# Patient Record
Sex: MALE
[De-identification: names, ages, dates, MRNs, and addresses within clinical notes are randomized per-mention and may not be internally consistent; named-entity substitution may affect disease eponyms.]

---

## 2022-01-21 ENCOUNTER — HOSPITAL ENCOUNTER (EMERGENCY)
Dept: HOSPITAL 97 - ER | Age: 62
End: 2022-01-21
Payer: COMMERCIAL

## 2022-01-21 VITALS — OXYGEN SATURATION: 98 %

## 2022-01-21 VITALS — DIASTOLIC BLOOD PRESSURE: 24 MMHG | SYSTOLIC BLOOD PRESSURE: 45 MMHG

## 2022-01-21 DIAGNOSIS — Z99.2: ICD-10-CM

## 2022-01-21 DIAGNOSIS — N18.6: ICD-10-CM

## 2022-01-21 DIAGNOSIS — D63.1: ICD-10-CM

## 2022-01-21 DIAGNOSIS — E66.9: ICD-10-CM

## 2022-01-21 DIAGNOSIS — R57.0: Primary | ICD-10-CM

## 2022-01-21 DIAGNOSIS — Z88.5: ICD-10-CM

## 2022-01-21 DIAGNOSIS — E87.2: ICD-10-CM

## 2022-01-21 DIAGNOSIS — J96.00: ICD-10-CM

## 2022-01-21 PROCEDURE — 92950 HEART/LUNG RESUSCITATION CPR: CPT

## 2022-01-21 PROCEDURE — 93005 ELECTROCARDIOGRAM TRACING: CPT

## 2022-01-21 PROCEDURE — 99291 CRITICAL CARE FIRST HOUR: CPT

## 2022-01-21 PROCEDURE — 31500 INSERT EMERGENCY AIRWAY: CPT

## 2022-01-21 PROCEDURE — 82947 ASSAY GLUCOSE BLOOD QUANT: CPT

## 2022-01-21 PROCEDURE — 82805 BLOOD GASES W/O2 SATURATION: CPT

## 2022-01-21 NOTE — EDPHYS
Physician Documentation                                                                           

 [facility]                                                                 

Name: Bhavik Norris                                                                                

Age: 61 yrs                                                                                       

Sex: Male                                                                                         

: 1960                                                                                   

MRN: [id number]                                                                                   

Arrival Date: 2022                                                                          

Time: 15:38                                                                                       

Account#: [id number]                                                                            

Bed 3                                                                                             

Private MD:                                                                                       

ED Physician Mohit Ruff                                                                      

HPI:                                                                                              

                                                                                             

16:53 This 61 yrs old  Male presents to ER via EMS with complaints of CPR.           omega 

16:53 Preceding the arrest, the patient collapsed, was dyspneic, was found down by family.    omega 

      The arrest occurred at home. Pre-hospital course: EMS care prior to arrival: initiation     

      of ACLS, oxygen, 100% by ET tube. ACLS has been in progress for 20 minutes. ACLS            

      details: Initial rhythm was PEA. The presenting rhythm is PEA. Airway: oral intubation,     

      see erik, acls protocol. The patient has not experienced similar symptoms in the past.       

                                                                                                  

Historical:                                                                                       

- Allergies:                                                                                      

16:16 Morphine;                                                                               ss  

- PMHx:                                                                                           

16:16 Renal failure; HD (at home);                                                            ss  

                                                                                                  

- Immunization history:: Client reports receiving the 2nd dose of the Covid vaccine.              

- Family history:: not pertinent.                                                                 

                                                                                                  

                                                                                                  

ROS:                                                                                              

16:53 Unable to obtain ROS due to patient is on ventilator, CPR in progress.                  omega 

                                                                                                  

Exam:                                                                                             

16:53 Eyes: Pupils: are fixed and dilated.                                                    omega 

16:53 Cardiovascular: Rate: actual rate is  0 bpm, Rhythm: irregular, Pulses: not palpable,       

      Heart sounds: none, Edema: 1+ edema to level of left midcalf and right midcalf, JVD: is     

      not appreciated.                                                                            

16:53 ECG was reviewed by the Attending Physician.                                                

                                                                                                  

Vital Signs:                                                                                      

15:27 Pulse 0; Resp 16;                                                                       ss  

15:34 Pulse 184;                                                                              ss  

15:36  / 62; Pulse Ox 100% on ETT ambu;                                                 ss  

15:39  / 96; Pulse 159; Resp 18; Pulse Ox 98% on 100% FiO2 ETT vent;                    ss  

15:42  / 87;                                                                            ss  

16:01  / 72; Pulse 152; Resp 20 A;                                                      jh6 

16:01 BP 47 / 29; Pulse 56; Resp 20 A;                                                        jh6 

16:05 BP 90 / 48; Pulse 111; Resp 20 A;                                                       jh6 

16:07 BP 54 / 29; Pulse 54; Resp 20 A;                                                        jh6 

16:10 BP 45 / 24; Pulse 42; Resp 20 A;                                                        jh6 

                                                                                                  

Procedures:                                                                                       

16:59 Intubation: Intubated orally using # 4 Rodrigo blade with 7.5 mm ETT. was successful  omega 

      on first attempt. Ventilated with Ambu bag. Cricoid pressure applied during procedure.      

      Tube secured at right side of mouth Placement verified by CO2 detector with (+) color       

      change, auscultating bilateral breath sounds, Patient tolerated well. Central Line: the     

      site was prepped with Betadine, in sterile fashion, a triple lumen catheter was             

      inserted, in the right femoral vein, in 2 attempts. placement was verified, by blood        

      return, the site was dressed with Tegaderm, using sterile technique, the patient            

      tolerated the procedure, well.                                                              

                                                                                                  

MDM:                                                                                              

16:31 Patient medically screened.                                                             omega 

16:58 Differential diagnosis: arrythmia, cardiac arrest, respiratory arrest. Data reviewed:   [facility] 

      vital signs, nurses notes, lab test result(s), EKG, radiologic studies. Data                

      interpreted: Cardiac monitor: rate is 112 beats/min, rhythm is regular, Pulse oximetry:     

      on ventilator is 96 %. Test interpretation: by ED physician or midlevel provider: ECG,      

      plain radiologic studies.                                                                   

                                                                                                  

                                                                                             

15:45 Order name: Glucose, Ancillary Testing                                                  EDMS

                                                                                                  

EC:53 Rate is 112 beats/min. Rhythm is regular. OR interval is normal. QRS interval is        omega 

      normal. QT interval is normal. No Q waves. T waves are Normal in leads II, III, aVF. T      

      waves are Inverted in leads V4, V5, V6. Interpreted by me. Reviewed by me.                  

                                                                                                  

Administered Medications:                                                                         

15:31 Drug: EPINEPHrine 0.1mg/mL 1:10,000 1 mg {Note: Dialysis catheter R anterior chest      ss  

      wall, per Dr. Ruff.} Route: IVP; Site: Other;                                           

15:32 Drug: Sodium Bicarbonate 1 amp Route: IVP; Site: Other;                                 ss  

15:34 Drug: D50W 50 ml Route: IVP; Site: Other;                                               ss  

15:50 Drug: EPINEPHrine 0.1mg/mL 1:10,000 1 mg Route: IVP; Site: Other;                       ss  

15:51 Drug: Sodium Bicarbonate 1 amp Route: IVP; Site: right femoral;                         ss  

15:56 Drug: EPINEPHrine 0.1mg/mL 1:10,000 1 mg Route: IVP; Site: right femoral;               ss  

                                                                                                  

                                                                                                  

Disposition Summary:                                                                              

22 17:04                                                                                    

Patient                                                                                    

      Location:  Home                                                                  omega 

      Pronouncing Physician: Mohit Ruff cha 

      Time of Death: 16:45 2022                                                         omega 

      Diagnosis                                                                                   

        - Acute respiratory failure                                                           omega 

        - Acidosis                                                                            omega 

        - End stage renal disease - on HD                                                     omega 

        - Obesity, unspecified                                                                omega 

        - Do not resuscitate                                                                  omega 

        - Anemia, unspecified                                                                 oemga 

        - Cardiogenic shock                                                                   omega 

        - Cardiac arrest due to underlying cardiac condition                                  omega 

Signatures:                                                                                       

Dispatcher [employer]                           EDMohit Duncan MD MD cha Smirch, [place], RN                      RN   ss                                                   

                                                                                                  

Corrections: (The following items were deleted from the chart)                                    

16:47 15:48 Chest Single View+RAD.RAD.BRZ ordered. EDMS                                       AMADO

                                                                                                  

**************************************************************************************************

## 2022-01-21 NOTE — ER
Nurse's Notes                                                                                     

 [facility]                                                                 

Name: Bhavik Norris                                                                                

Age: 61 yrs                                                                                       

Sex: Male                                                                                         

: 1960                                                                                   

MRN: [id number]                                                                                   

Arrival Date: 2022                                                                          

Time: 15:38                                                                                       

Account#: [id number]                                                                            

Bed 3                                                                                             

Private MD:                                                                                       

Diagnosis: Acute respiratory failure;Acidosis;End stage renal disease-on HD;Obesity,              

  unspecified;Do not resuscitate;Anemia, unspecified;Cardiogenic shock;Cardiac arrest             

  due to underlying cardiac condition                                                             

                                                                                                  

Presentation:                                                                                     

                                                                                             

15:27 Chief complaint: EMS states: CPR in progress. Pt reportedly not feeling well for the    ss  

      past few days. Was on home dialysis which they stopped prematurely yesterday due to low     

      BP. Wife came home to find patient unresponsive on couch. Care prior to arrival: Oxygen     

      administered. Igel tube #5 inserted and bagged manually with AMBU bag R tibia IO            

      inserted. Compressions began at 14:50.                                                      

15:27 Method Of Arrival: EMS: [facility]                                                         

15:27 Acuity: MISAEL 1                                                                             

15:27 Coronavirus screen: COVID test completed yesterday, was negative according to wife.       

      Ebola Screen: Patient denies travel to an Ebola-affected area in the 21 days before         

      illness onset. Unable to complete the Ebola screening because:. Initial Sepsis Screen:.     

      Onset of symptoms is unknown. Care prior to arrival: CPR via thumper Medication(s)          

      given: Epi x x 4, calcium gluconate and bicarb 1 amp given en route VIA IO. Initial         

      cardiac rhythm was reported to be asystole, after epi had been administered, still no       

      pulse noted upon arrival to ED, rhythm is PEA. Transition of care: patient was not          

      received from another setting of care.                                                      

                                                                                                  

Historical:                                                                                       

- Allergies:                                                                                      

16:16 Morphine;                                                                               ss  

- PMHx:                                                                                           

16:16 Renal failure; HD (at home);                                                            ss  

                                                                                                  

- Immunization history:: Client reports receiving the 2nd dose of the Covid vaccine.              

- Family history:: not pertinent.                                                                 

                                                                                                  

                                                                                                  

Assessment:                                                                                       

15:30 Reassessment: Arrival to ED, cardiac rhythm PEA. CPR continued VIA thumper. Igel # 5    ss  

      tube inserted en route VIA oral intubation. Pt's skin is pale, mottled. Pitting edema       

      noted to bilateral lower extremities.                                                       

15:32 Reassessment: No pulse, PEA, thumper removed. Manual CPR initiated by ED staff. Dr. itz Ruff at bedside for intubation.                                                         

15:32 Reassessment: BGL 75.                                                                   ss  

15:34 Reassessment: Pulse check, + pulse.                                                     ss  

15:45 Reassessment: No pulse, PEA. Manual CPR started. Family updated.                        ss  

15:47 Reassessment: Pulse check, No pulse, asystole.                                          ss  

15:52 Reassessment: + pulse.                                                                  ss  

15:56 Reassessment: No pulse, manual CPR initiated.                                           ss  

15:58 Reassessment: Pulse check, + pulse, .                                             ss  

17:33 Reassessment: PD at bedside.                                                            ap3 

                                                                                                  

Vital Signs:                                                                                      

15:27 Pulse 0; Resp 16;                                                                       ss  

15:34 Pulse 184;                                                                              ss  

15:36  / 62; Pulse Ox 100% on ETT ambu;                                                 ss  

15:39  / 96; Pulse 159; Resp 18; Pulse Ox 98% on 100% FiO2 ETT vent;                    ss  

15:42  / 87;                                                                            ss  

16:01  / 72; Pulse 152; Resp 20 A;                                                      jh6 

16:01 BP 47 / 29; Pulse 56; Resp 20 A;                                                        jh6 

16:05 BP 90 / 48; Pulse 111; Resp 20 A;                                                       jh6 

16:07 BP 54 / 29; Pulse 54; Resp 20 A;                                                        jh6 

16:10 BP 45 / 24; Pulse 42; Resp 20 A;                                                        jh6 

                                                                                                  

ED Course:                                                                                        

15:30 Cardiac monitor on. Pulse ox on. NIBP on.                                               ss  

15:33 Assisted provider with intubation using 7.5 mm ETT via oral route. ET tube secured at   ss  

      26cm at the lips. Set up intubation tray. Intubated by Mohit Ruff MD Placement          

      verified by CO2 detector w/ + color change, auscultating bilateral breath sounds.           

15:38 Patient arrived in ED.                                                                  eb  

15:38 Mohit Ruff MD is Attending Physician.                                             eb  

15:40 NGT: inserted 16 Fr. via left nare. verified placement of air over stomach, verified    ss  

      return of gastric contents, to intermittent suction.                                        

15:55 Assisted provider with central line placement. Set up central line tray. Triple lumen   ss  

      line placed in right femoral. Line placed by Mohit Ruff MD Blood was collected.         

      Patient tolerated unresponsive, Before procedure, did Practitioner(s) obtain informed       

      consent? No.                                                                                

16:00 Arm band placed on left wrist.                                                          ss  

16:10 Triage completed.                                                                       ss  

16:35 Dianne Miller, RN is Primary Nurse.                                                 jh6 

16:49 called the [place] [employer] to page out he .                                eb  

17:02 Mohit Ruff MD is Pronouncing Provider.                                            [facility] 

                                                                                                  

Administered Medications:                                                                         

15:31 Drug: EPINEPHrine 0.1mg/mL 1:10,000 1 mg {Note: Dialysis catheter R anterior chest      ss  

      wall, per Dr. Ruff.} Route: IVP; Site: Other;                                           

15:32 Drug: Sodium Bicarbonate 1 amp Route: IVP; Site: Other;                                 ss  

15:34 Drug: D50W 50 ml Route: IVP; Site: Other;                                               ss  

15:50 Drug: EPINEPHrine 0.1mg/mL 1:10,000 1 mg Route: IVP; Site: Other;                       ss  

15:51 Drug: Sodium Bicarbonate 1 amp Route: IVP; Site: right femoral;                         ss  

15:56 Drug: EPINEPHrine 0.1mg/mL 1:10,000 1 mg Route: IVP; Site: right femoral;               ss  

                                                                                                  

                                                                                                  

Outcome:                                                                                          

16:45 Outcome Patient                                                                  ss  

16:45  Condition:                                                                          

18:43 Patient left the ED.                                                                    ss  

                                                                                                  

Signatures:                                                                                       

Mohit Ruff MD MD cha Smirch, [place] RN                      RN                                                      

Shireen Baca RN                    RN   Oriana Forte                                                                              

Dianne Mliler, RN                   RN   6                                                  

                                                                                                  

Corrections: (The following items were deleted from the chart)                                    

18:33 14:45 Outcome Patient  ss                                                        ss  

18:33 14:45 Condition:  ss                                                             ss  

                                                                                                  

**************************************************************************************************

## 2022-01-21 NOTE — XMS REPORT
Continuity of Care Document

                           Created on:2022



Patient:MADISON FERRELL

Sex:Male

:1960

External Reference #:[id number]





Demographics







                          Address                   [address]



                                                    [place] [address]

 

                          Home Phone                [phone]

 

                          Work Phone                [phone]

 

                          Mobile Phone              [phone]

 

                          Email Address             NONE

 

                          Preferred Language        en-US

 

                          Marital Status            Unknown

 

                          Faith Affiliation     Unknown

 

                          Race                      Unknown

 

                          Additional Race(s)        Unavailable

 

                          Ethnic Group              Unknown









Author







                          Organization              [facility]

t

 

                          Address                   [address]



                                                    [place] [address]

 

                          Phone                     [phone]









Support







                Name            Relationship    Address         Phone

 

                MARIAELENA         Employer        [address]   [phone]



                                                [place] [address] 









Care Team Providers







                    Name                Role                Phone

 

                    YASMANY              Primary Care Physician Unavailable

 

                    THOM GARCIA       Attending Clinician Unavailable

 

                    ERICKA MERCADO     Attending Clinician Unavailable

 

                    KIRSTIN              Attending Clinician Unavailable

 

                    CAYETANO              Attending Clinician Unavailable

 

                    MD CAYETANO          Attending Clinician Unavailable

 

                    LUIS                Attending Clinician Unavailable

 

                    CHILANGO                  Attending Clinician Unavailable

 

                    KEELY              Attending Clinician Unavailable

 

                    GONSALO             Attending Clinician Unavailable

 

                    Patricia LAKHANI           Attending Clinician [phone]

 

                    CAESAR Chan MD    Attending Clinician [phone]

 

                    CAESAR Garcia MD         Attending Clinician [phone]

 

                    THOM CHAN  Attending Clinician Unavailable

 

                    THOM GARCIA       Admitting Clinician Unavailable

 

                    ERICKA MERCADO     Admitting Clinician Unavailable

 

                    KIRSTIN              Admitting Clinician Unavailable

 

                    CAYETANO              Admitting Clinician Unavailable

 

                    MD CAYETANO          Admitting Clinician Unavailable

 

                    GONSALO             Admitting Clinician Unavailable

 

                    CAYETANO              Admitting Clinician Unavailable

 

                    THOM CHAN  Admitting Clinician Unavailable









Payers







           Payer Name Policy Type Policy Number Effective Date Expiration Date S

melany

 

           [employer] H[employer] POS QPOS            [id number] 2015            



                                            00:00:00              

 

           MEDICARE A B            [id number] 2010            



                                            00:00:00              

 

           CDC REVIEW            [id number]   2020            



                                            00:00:00              

 

           ZZCDCREVIEW            [id number]   2019 



                                            00:00:00   00:00:00   







Problems







       Condition Condition Condition Status Onset  Resolution Last   Treating Co

mments 

Source



       Name   Details Category        Date   Date   Treatment Clinician        



                                                 Date                 

 

       Lymphedema Lymphedema Disease Active                              [facility]



       of left of left                                              [place]



       leg    leg                  00:00:                             of



                                   00                                 [facility]



                                                                      e

 

       LCEA:; LCEA:; Disease Active                              [facility]

[facility]



       RICAS: RICAS:                               [facility]

llege



       /                  00:00:                             of



                                   00                                 [facility]



                                                                      e

 

       Bilateral Bilateral Disease Active                              [facility]



       Leg    Leg                                                 [place]



       swelling swelling               00:00:                             of



                                   00                                 [facility]



                                                                      e

 

       Non-healin Non-healin Disease Active                              [facility]

[facility]



       g ulcer of g ulcer of                                              [facility]

llege



       left foot, left foot,               00:00:                             of



       with   with                 00                                 [facility]



       unspecifie unspecifie                                                  e



       d severity d severity                                                  



       (HCCode) (HCCode)                                                  

 

       Atheroscle Atheroscle Disease Active                              

[facility]



       rotic PVD rotic PVD                                              [place]

e[facility]



       with   with                 00:00:                             of



       intermitte intermitte               00                                 Me

dicin



       nt     nt                                                      e



       claudicati claudicati                                                  



       on     on                                                      



       (HCCode) (HCCode)                                                  

 

       Morbid Morbid Disease Active                              [facility]



       obesity obesity                                              [facility]



       with BMI with BMI               00:00:                             of



       of     of                   00                                 [facility]



       45.0-49.9, 45.0-49.9,                                                  e



       adult  adult                                                   



       (HCCode) (HCCode)                                                  

 

       AVELINA    AVELINA    Disease Active                              [facility]



       (obstructi (obstructi                                              [facility]

llege



       ve sleep ve sleep               00:00:                             of



       apnea) apnea)               00                                 [facility]



                                                                      e

 

       Renal  Renal  Disease Active                              [facility]



       insufficie insufficie                                              [facility]

llege



       ncy    ncy                  00:00:                             of



                                   00                                 [facility]



                                                                      e

 

       DM2    DM2    Disease Active                              [facility]



       (diabetes (diabetes                                              [place]

ege



       mellitus, mellitus,               00:00:                             of



       type 2) type 2)               00                                 [facility]



       (HCCode) (HCCode)                                                  e

 

       CAD    CAD    Disease Active                              [facility]



       (coronary (coronary                                              [place]

e[facility]



       artery artery               00:00:                             of



       disease) disease)               00                                 [facility]

n



       s/p ACB in s/p ACB in                                                  e



       2006                                                    

 

       Rheumatoid Rheumatoid Disease Active                              [facility]



       arthritis arthritis                                              [place]

ege



       (HCCode) (HCCode)               00:00:                             of



                                   00                                 [facility]



                                                                      e

 

       HTN    HTN    Disease Active                              [facility]



       (hypertens (hypertens               7-09                               [facility]

llege



       ion)   ion)                 00:00:                             of



                                   00                                 [facility]



                                                                      e

 

       HLD    HLD    Disease Active                              [facility]



       (hyperlipi (hyperlipi               7-09                               [facility]

llege



       demia) demia)               00:00:                             of



                                   00                                 [facility]



                                                                      e

 

       Renal         Problem Active               2021               [facility]

[facility]



       insufficie                                    02:46:42               l



       ncy      Renal                                                  [place]



              insufficie                                                  



              ncy                                                     



                                                                      



                                                                      



              Active                                                  



                                                                      



                                                                      



                                                                      



                                                                      



              Problem                                                  



                                                                      



                                                                      



              2021                                                  



                                                                      



                                                                      



                                                                      



                                                                      



                 [facility]                                                  



              [facility]                                                  



                                                                      



                                                                      

 

       Chronic        Problem Active               2021               [facility]

jessica



       kidney                                    02:46:42               l



       disease,   Chronic                                                  [place]

nn



       stage 3 kidney                                                  



              disease,                                                  



              stage 3                                                  



                                                                      



                                                                      



              Active                                                  



                                                                      



                                                                      



                                                                      



                                                                      



              Problem                                                  



                                                                      



                                                                      



              2021                                                  



                                                                      



                                                                      



                                                                      



                                                                      



                 [facility]                                                  



              [facility]                                                  



                                                                      



                                                                      

 

       Hematuria        Diagnosis Active               2020               

[facility]



                                                 02:45:29               l



                                                                      [place]



              Hematuria                                                  



                                                                      



                                                                      



              Active                                                  



                                                                      



                                                                      



                                                                      



                                                                      



              Diagnosis                                                  



                                                                      



                                                                      



              2020                                                  



                                                                      



                                                                      



                                                                      



                                                                      



                 [facility]                                                  



              [facility]                                                  



                                                                      



                                                                      

 

       Acute         Problem Active               2021               [facility]

[facility]



       arthritis                                    02:46:42               l



                Acute                                                  [place]



              arthritis                                                  



                                                                      



                                                                      



              Active                                                  



                                                                      



                                                                      



                                                                      



                                                                      



              Problem                                                  



                                                                      



                                                                      



              2021                                                  



                                                                      



                                                                      



                                                                      



                                                                      



                 [facility]                                                  



              [facility]                                                  



                                                                      



                                                                      

 

       Rheumatoid        Problem Active               2021               [facility]

[facility]



       arthritis                                    02:46:42               l



       with                                                           [place]



       rheumatoid Rheumatoid                                                  



       factor of arthritis                                                  



       multiple with                                                    



       sites  rheumatoid                                                  



       without factor of                                                  



       organ or multiple                                                  



       systems sites                                                   



       involvemen without                                                  



       t      organ or                                                  



              systems                                                  



              involvemen                                                  



              t                                                       



                                                                      



                 Active                                                  



                                                                      



                                                                      



                                                                      



                                                                      



                Problem                                                  



                                                                      



                                                                      



              2021                                                  



                                                                      



                                                                      



                                                                      



                                                                      



                 [facility]                                                  



              [facility]                                                  



                                                                      



                                                                      

 

       Foot drop,        Problem Active               2021               [facility]

[facility]



       left foot                                    02:46:42               l



                Foot                                                  [place]



              drop, left                                                  



              foot                                                    



                                                                      



                                                                      



              Active                                                  



                                                                      



                                                                      



                                                                      



                                                                      



              Problem                                                  



                                                                      



                                                                      



              2021                                                  



                                                                      



                                                                      



                                                                      



                                                                      



                 [facility]                                                  



              [facility]                                                  



                                                                      



                                                                      

 

       Macrocytos        Problem Active               2021               [facility]

[facility]



       is                                        02:46:42               l



                                                                      [place]



              Macrocytos                                                  



              is                                                      



                                                                      



                  Active                                                  



                                                                      



                                                                      



                                                                      



                                                                      



                 Problem                                                  



                                                                      



                                                                      



                                                                      



                                                                      



                                                                      



                                                                      



                   [facility]                                                  



              [facility]                                                  



                                                                      



                                                                      

 

       Acute         Problem Active               2021               [facility]

ia



       sinusitis,                                    02:46:42               l



       recurrence   Acute                                                  [place]

nn



       not    sinusitis,                                                  



       specified, recurrence                                                  



       unspecifie not                                                     



       d location specified,                                                  



              unspecifie                                                  



              d location                                                  



                                                                      



                                                                      



               Active                                                  



                                                                      



                                                                      



                                                                      



                                                                      



              Problem                                                  



                                                                      



                                                                      



              2021                                                  



                                                                      



                                                                      



                                                                      



                                                                      



                 [facility]                                                  



              [facility]                                                  



                                                                      



                                                                      

 

       Encounter        Problem Active               2021               Me

moria



       for                                       02:46:42               l



       long-term                                                         [place]



       (current) Encounter                                                  



       use of for                                                     



       other  long-term                                                  



       medication (current)                                                  



       s      use of                                                  



              other                                                   



              medication                                                  



              s                                                       



                                                                      



                 Active                                                  



                                                                      



                                                                      



                                                                      



                                                                      



                Problem                                                  



                                                                      



                                                                      



              2021                                                  



                                                                      



                                                                      



                                                                      



                                                                      



                 [facility]                                                  



              [facility]                                                  



                                                                      



                                                                      

 

       Benign        Problem Active               2021               [facility]

ia



       essential                                    02:46:42               l



       hypertensi   Benign                                                  Herm

caitie



       on     essential                                                  



              hypertensi                                                  



              on                                                      



                                                                      



                  Active                                                  



                                                                      



                                                                      



                                                                      



                                                                      



                 Problem                                                  



                                                                      



                                                                      



                                                                      



                                                                      



                                                                      



                                                                      



                   [facility]                                                  



              [facility]                                                  



                                                                      



                                                                      

 

       Atheroscle        Problem Active               2021               [facility]

[facility]



       rotic                                     02:46:42               l



       heart                                                          [place]



       disease of Atheroscle                                                  



       native rotic                                                   



       coronary heart                                                   



       artery disease of                                                  



       without native                                                  



       angina coronary                                                  



       pectoris artery                                                  



              without                                                  



              angina                                                  



              pectoris                                                  



                                                                      



                                                                      



              Active                                                  



                                                                      



                                                                      



                                                                      



                                                                      



              Problem                                                  



                                                                      



                                                                      



              2021                                                  



                                                                      



                                                                      



                                                                      



                                                                      



                 [facility]                                                  



              [facility]                                                  



                                                                      



                                                                      

 

       Vitamin D        Problem Active               2021               Me

[facility]



       deficiency                                    02:46:42               l



                Vitamin                                                  [place]



              D                                                       



              deficiency                                                  



                                                                      



                                                                      



               Active                                                  



                                                                      



                                                                      



                                                                      



                                                                      



              Problem                                                  



                                                                      



                                                                      



              2021                                                  



                                                                      



                                                                      



                                                                      



                                                                      



                 [facility]                                                  



              [facility]                                                  



                                                                      



                                                                      







Allergies, Adverse Reactions, Alerts







       Allergy Allergy Status Severity Reaction(s) Onset  Inactive Treating Comm

ents 

Source



       Name   Type                        Date   Date   Clinician        

 

       MORPHINE Allergy Active        Other                        SLE



                                                                  



                                          00:00:                      



                                          00                          







Social History







           Social Habit Start Date Stop Date  Quantity   Comments   Source

 

           Sex Assigned At                       [facility]

llege



           Birth                                                  of Medicine

 

           Exposure to                       Not sure              [facility]

e



           SARS-CoV-2                                             of Medicine



           (event)                                                

 

           Alcohol intake 2020 Current drinker            [facility]



                      00:00:00   00:00:00   of alcohol            of Medicine



                                            (finding)             









                Smoking Status  Start Date      Stop Date       Source

 

                Never smoker                                    [facility]

f Medicine







Medications







       Ordered Filled Start  Stop   Current Ordering Indication Dosage Frequency

 Signature

                    Comments            Components          Source



     Medication Medication Date Date Medication? Clinician                (SIG) 

          



     Name Name                                                   

 

     Tramadol            Yes  [facility]ec[facility]                1 tablet           M

[facility]



     HCl       8-23           [place]                as needed           l



               00:00:                                                                                              

 

     HydrALAZINE            Yes  [facility]                1 tablet         

  [facility]



     HCl       5-12           [place]                with food           l



               02:45:                                                                                              

 

     Clonidine            Yes  [facility]                1 tablet           

[facility]



     HCl       5-12           [place]                               l



               02:45:                                                                                              

 

     Enbrel            Yes  Cecil                INJECT 1           Mem

oria



               5-12           [place]                SYRINGE           l



               02:45:                               UNDER THE                                            SKIN EVERY           



                                                  7 DAYS.           

 

     Omeprazole            Yes  [facility]                1 tablet          

 [facility]



     Magnesium      5-12           [place]                30 minutes          

 l



               02:45:                               before                                            morning           



                                                  meal           

 

     Toprol XL            Yes  [facility]ecil                1.5            Ricky

jessica



               5-12           [place]                tablet           l



               02:45:                                                                                              

 

     Atorvastati            Yes  [facility]                1 tablet         

  [facility]



     n Calcium      5-12           [place]                               l



               02:45:                                              [place]



                                                               

 

     Allopurinol            Yes  [facility]                1 tablet         

  [facility]



               5-12           [place]                               l



               02:45:                                                                                              

 

     Citalopram            Yes  [facility]                1 tablet          

 [facility]



     Hydrobromid      5-12           [place]                               l



     e         02:45:                                              [place]



                                                               

 

     Famotidine            Yes  [facility]ec[facility]                1 tablet          

 [facility]



               5-12           [place]                at bedtime           l



               02:45:                               as needed           [place]



                                                               

 

     GuaiFENesin            Yes  [facility]                1 tablet         

  [facility]



     ER        5-12           [place]                as needed           l



               02:45:                                              [place]



                                                               

 

     Metoprolol            Yes  [facility]                1 capsule         

  [facility]



     Succinate      5-           [place]                               l



               02:45:                                              [place]



                                                               

 

     Folic Acid            Yes  [facility]ec[facility]                1 capsule         

  [facility]



               5-12           [place]                               l



               02:45:                                              [place]



                                                               

 

     Tramadol            Yes  [facility]ec[facility]                1 tablet           M

[facility]



     HCl       5-           [place]                as needed           l



               02:45:                                              [place]



                                                               

 

     Aspir-81            Yes  [facility]                1 tablet           M

[facility]



               5-12           [place]                               l



               02:45:                                              [place]



                                                               

 

     Fenofibrate            Yes  [facility]                1 tablet         

  [facility]



               5-12           [place]                with food           l



               02:45:                                              [place]



                                                               

 

     Lovastatin            Yes  [facility]ec[facility]                1 tablet          

 [facility]



               5-12           [place]                with a           l



               02:45:                               meal           [place]



                                                               

 

     Lisinopril            Yes  [facility]                1 tablets         

  [facility]



               5-12           [place]                               l



               02:45:                                              [place]



                                                               

 

     Prasugrel            Yes  [facility]                1 tablet           

[facility]



     HCl       5-12           [place]                               l



               02:45:                                              [place]



                                                               

 

     Ergocalcife            Yes  [facility]                1 capsule        

   [facility]



     rol                  [place]                               l



               00:00:                                              [place]



                                                               

 

     Etanercept            Yes                      Inject           [facility]

r



     (ENBREL) 50      -                               into the           [place]

ege



     MG/ML SOSY      12:55:                               skin.           of



               48                                                Medicin



                                                                 e

 

     aspirin 81            Yes            81mg      Take 81 mg           [facility]

[facility]



     MG tablet                                     by mouth           [facility]

e



               12:55:                               daily.           of



               48                                                Medicin



                                                                 e

 

     METOPROLOL            Yes            150mg{p      Take 150           

[facility]



     SUCCINATE      7-31                     henytoi      mg PE by           [facility]

lege



     ER OR      12:55:                     n'equiv      mouth           of



               48                       alent}      daily.           [facility]icin



                                                                 e

 

     lisinopril      2020-0      Yes            20mg      Take 20 mg           [facility]

[facility]



     (PRINIVIL,                                     by mouth           [facility]

ge



     ZESTRIL) 20      12:55:                               daily.           of



     MG tablet      48                                                [facility]icin



                                                                 e

 

     tramadol      2020-0      Yes            50mg      Take 50 mg           [facility]

[facility]



     (ULTRAM) 50      -                               by mouth           [place]

ege



     MG tablet      12:55:                               every 6           of



               48                                 hours as           [facility]icin



                                                  needed for           e



                                                  Pain.           

 

     atorvastati      2020-0      Yes            40mg      Take 40 mg           

[facility]



     n (LIPITOR)      7-                               by mouth           [place]

ege



     40 MG      12:55:                               daily.           of



     tablet      48                                                Medicin



                                                                 e

 

     allopurinol      2020-0      Yes            100mg      Take 100           [facility]

[facility]



     (ZYLOPRIM)      7-                               mg by           [place]



     100 MG      12:55:                               mouth two           of



     tablet      48                                 times           [facility]icin



                                                  daily.           e

 

     Etanercept      2020-0      Yes                      Inject           [facility]

r



     (ENBREL) 50                                     into the           [place]

ege



     MG/ML SOSY      19:24:                               skin.           of



               41                                                Medicin



                                                                 e

 

     aspirin 81      2020-0      Yes            81mg      Take 81 mg           [facility]

[facility]



     MG tablet                                     by mouth           [facility]

e



               19:24:                               daily.           of



               41                                                Medicin



                                                                 e

 

     METOPROLOL      2020-0      Yes            150mg{p      Take 150           

[facility]



     SUCCINATE      7-                     henytoi      mg PE by           [facility]

lege



     ER OR      19:24:                     n'equiv      mouth           of



               41                       alent}      daily.           [facility]icin



                                                                 e

 

     lisinopril      2020-0      Yes            20mg      Take 20 mg           [facility]

[facility]



     (PRINIVIL,                                     by mouth           [facility]

ge



     ZESTRIL) 20      19:24:                               daily.           of



     MG tablet      41                                                [facility]icin



                                                                 e

 

     Empaglifloz      2020-0      Yes                      Take  by           [facility]



     in                                       mouth.           [facility]



     (JARDIANCE)      19:24:                                              of



     10 MG TABS      41                                                [facility]icin



                                                                 e

 

     tramadol      2020-0      Yes            50mg      Take 50 mg           [facility]

[facility]



     (ULTRAM) 50      -                               by mouth           [place]

ege



     MG tablet      19:24:                               every 6           of



               41                                 hours as           [facility]icin



                                                  needed for           e



                                                  Pain.           

 

     atorvastati      2020-0      Yes            40mg      Take 40 mg           

[facility]



     n (LIPITOR)      -23                               by mouth           [place]

ege



     40 MG      19:24:                               daily.           of



     tablet      41                                                [facility]icin



                                                                 e

 

     allopurinol      2020-0      Yes            100mg      Take 100           [facility]

[facility]



     (ZYLOPRIM)      7-23                               mg by           [place]



     100 MG      19:24:                               mouth two           of



     tablet      41                                 times           [facility]icin



                                                  daily.           e

 

     Empaglifloz      2020-0      Yes                      Take  by           [facility] proctor



     in                                       mouth.           [place]



     (JARDIANCE)      19:24:                                              of



     10 MG TABS      41                                                Medicin



                                                                 e

 

     fenofibrate      2020-0      Yes            145mg      Take 145           [facility]

[facility]



     (TRICOR)      7-11                               mg by           [place]



     145 MG      00:00:                               mouth           of



     tablet      00                                 daily.           [facility]



                                                                 e

 

     fenofibrate      2020-0      Yes            145mg      Take 145           [facility]

[facility]



     (TRICOR)      7-11                               mg by           [place]



     145 MG      00:00:                               mouth           of



     tablet      00                                 daily.           [facility]



                                                                 e

 

     Empaglifloz      2020-0      Yes                      Take  by           [facility]



     in        06                               mouth.           [facility]



     (JARDIANCE)      21:48:                                              of



     10 MG TABS      51                                                [facility]



                                                                 e

 

     tramadol      2020-0      Yes            50mg      Take 50 mg           [facility]

[facility]



     (ULTRAM) 50                                     by mouth           [place]

ege



     MG tablet      21:48:                               every 6           of



               51                                 hours as           [facility]



                                                  needed for           e



                                                  Pain.           

 

     atorvastati      2020-0      Yes            40mg      Take 40 mg           

[facility]



     n (LIPITOR)                                     by mouth           [place]

ege



     40 MG      21:48:                               daily.           of



     tablet      51                                                [facility]



                                                                 e

 

     Etanercept      2020-0      Yes                      Inject           [facility]

r



     (ENBREL) 50                                     into the           [place]

ege



     MG/ML SOSY      21:48:                               skin.           of



               51                                                [facility]



                                                                 e

 

     aspirin 81      2020-0      Yes            81mg      Take 81 mg           [facility]

[facility]



     MG tablet                                     by mouth           [facility]

e



               21:48:                               daily.           of



               51                                                [facility]



                                                                 e

 

     METOPROLOL      2020-0      Yes            150mg{p      Take 150           

[facility]



     SUCCINATE                           henytoi      mg PE by           [facility]

lege



     ER OR      21:48:                     n'equiv      mouth           of



               51                       alent}      daily.           [facility]



                                                                 e

 

     lisinopril      2020-0      Yes            20mg      Take 20 mg           [facility]

[facility]



     (PRINIVIL,                                     by mouth           [facility]

ge



     ZESTRIL) 20      21:48:                               daily.           of



     MG tablet      51                                                [facility]



                                                                 e

 

     methotrexat      2020-0 2020- No             2.5mg      Take 2.5           

[facility]



     e          07-06                          mg by           [place]



     (RHEUMATREX      21:48: 00:00                          mouth 3           of



     ) 2.5 MG      47   :00                           times           [facility]



     tablet                                         weekly.           e

 

     folic acid      2020- No             1mg       Take 1 mg           [facility]

[facility]



     (FOLVITE) 1                                by mouth           [facility]

lege



     MG tablet      21:48: 00:00                          daily.           of



               44   :00                                          [facility]



                                                                 e

 

     isosorbide      2020- No             30mg      Take 30 mg           

[facility]



     dinitrate                                by mouth           [facility]

ge



     (ISORDIL)      21:48: 00:00                          four times           o

f



     30 MG      41   :00                           daily.           [facility]



     tablet                                                        e

 

     lovastatin      2020- No             40mg      Take 40 mg           

[facility]



     (MEVACOR)                                by mouth           [facility]

ge



     40 MG      21:48: 00:00                          every           of



     tablet      35   :00                           evening.           [facility]



                                                                 e

 

     Enbrel      -0      Yes  [facility]                INJECT 1           [facility]

[facility]



                          Vo                  SYRINGE           l



               02:45:                               (50MG)           [place]                                 SUBCUTANEO           



                                                  USLY ONCE           



                                                  A WEEK.           



                                                  REFRIGERAT           



                                                  EEileen (PA)           

 

     Loratadine      -0      Yes  [facility]                1 tablet        

   [facility]



               -           Vo                                 l



               02:45:                                              [place]



                                                               

 

     Plavix      -0      Yes  [facility]                1 tablet           [facility]

[facility]



                          Vo                                 l



               02:45:                                              [place]



               02                                                

 

     Ergocalcife      -0      Yes  [facility]                1 capsule      

     [facility]



     rol                  Vo                                 l



               02:45:                                              [place]



               02                                                

 

     Symbicort      2020-0      Yes  [facility]                2 puffs          

 [facility]



               -           Vo                                 l



               02:45:                                              [place]



                                                               

 

     Jardiance      -0      Yes  [facility]                1 tablet         

  [facility]



               -03           Vo                                 l



               02:45:                                              [place]



                                                               

 

     Methotrexat      -0      Yes  [facility]                3 tablets      

     [facility]



     e         7-03           Vo                                 l



               02:45:                                              [place]



               02                                                

 

     Toprol XL      2020-0      Yes  [facility]                1.5            Me

[facility]



               7-03           Vo                  tablet           l



               02:45:                                              [place]



               02                                                

 

     Folic Acid      2020-0      Yes  [facility]                TAKE 1          

 [facility]



               3-30           Vo                  TABLET           l



               02:45:                               DAILY           [place]



               29                                                

 

     clopidogrel      2020-0 2020- No                       TAKE 1           [place]

[facility]



     (PLAVIX) 75      3--06                          TABLET BY           [facility]

llege



     MG Tablet      00:00: 00:00                          MOUTH           of



               00   :00                           EVERY DAY           [facility]



                                                                 e

 

     Prasugrel      2020-0      Yes                      Po qd           [facility]



     HCl 10 MG      1-                                              [facility]



     TABS      00:00:                                              of



               00                                                [facility]



                                                                 e

 

     Prasugrel            Yes                      Po qd           [facility]



     HCl 10 MG      



     TABS      00:00:                                                                                              [facility]



                                                                 e

 

     Prasugrel            Yes                      Po qd           [facility]



     HCl 10 MG                                                    [place]



     TABS      00:00:                                              of



               00                                                [facility]



                                                                 e

 

     Folic Acid            Yes  [facility]                take 1          

 [facility]



               6-25           Vo                  tablet           l



               00:00:                               daily                                                           

 

     Ergocalcife      2018      Yes  [facility]                1 capsule      

     [facility]



     rol       2-20           Vo                                 l



               00:00:                                              [place]                                                







Vital Signs







             Vital Name   Observation Time Observation Value Comments     Source

 

             HEIGHT       2020-10-15   172.7 cm                  



                          10:27:00                               

 

             WEIGHT       2020-10-15   139.436 kg                



                          10:27:00                               

 

             HEIGHT       2020-10-14   172.7 cm                  



                          12:29:00                               

 

             WEIGHT       2020-10-14   136.079 kg                



                          12:29:00                               

 

             HEIGHT       2020   172.7 cm                  



                          00:00:00                               

 

             WEIGHT       2020   136.9 kg                  



                          00:00:00                               

 

             HEIGHT       2020-10-15   172.7 cm                  



                          10:27:00                               

 

             WEIGHT       2020-10-15   139.436 kg                



                          10:27:00                               

 

             HEIGHT       2020-10-14   172.7 cm                  



                          12:29:00                               

 

             WEIGHT       2020-10-14   136.079 kg                



                          12:29:00                               

 

             Body weight  2020   136.079 kg                [facility]



                          12:54:00                               of Medicine

 

             BMI          2020   45.61 kg/m2               [facility]



                          12:54:00                               of Medicine

 

             Systolic blood 2020   190 mm[Hg]                [facility]

e



             pressure     12:54:00                               of Medicine

 

             Diastolic blood 2020   82 mm[Hg]                 [facility]

ge



             pressure     12:54:00                               of Medicine

 

             Heart rate   2020   74 /min                   [facility]



                          12:54:00                               of Medicine

 

             Body height  2020   172.7 cm                  [facility]



                          12:54:00                               of Medicine

 

             Systolic blood 2020   160 mm[Hg]                [facility]

e



             pressure     19:34:00                               of Medicine

 

             Diastolic blood 2020   94 mm[Hg]                 [facility]

ge



             pressure     19:34:00                               of Medicine

 

             Heart rate   2020   69 /min                   [facility]



                          19:23:00                               of Medicine

 

             Body height  2020   172.7 cm                  [facility]



                          19:23:00                               of Medicine

 

             Body weight  2020   137.44 kg                 [facility]



                          19:23:00                               of Medicine

 

             BMI          2020   46.07 kg/m2               [facility]



                          19:23:00                               of Medicine

 

             Systolic blood 2020   182 mm[Hg]   pt states he has [facility]

llege



             pressure     21:49:00                  not been taking of Medicine



                                                    his B/P      



                                                    medication   

 

             Diastolic blood 2020   89 mm[Hg]    pt states he has [facility]

ollege



             pressure     21:49:00                  not been taking of Medicine



                                                    his B/P      



                                                    medication   

 

             Heart rate   2020   68 /min                   [facility]



                          21:49:00                               of Medicine

 

             Body temperature 2020   36.67 Cassie                 [facility] [place]

ege



                          21:49:00                               of Medicine

 

             Respiratory rate 2020   18 /min                   [facility] [place]

ege



                          21:49:00                               of Medicine

 

             Body height  2020   172.7 cm                  [facility]



                          21:49:00                               of Medicine

 

             Body weight  2020   136.079 kg                [facility]



                          21:49:00                               of Medicine

 

             BMI          2020   45.61 kg/m2               [facility]



                          21:49:00                               of Medicine

 

             HEIGHT       2020   172.7 cm                  



                          00:00:00                               

 

             WEIGHT       2020   136.9 kg                  



                          00:00:00                               

 

             Weight       2019                             [facility]



                          14:00:00                               [place]

 

             Height       2019                             [facility]



                          14:00:00                               [place]

 

             Heart Rate   2019                             [facility]



                          14:00:00                               [place]

 

             Diastolic (mm Hg) 2019                             [facility]



                          14:00:00                               [place]

 

             Systolic (mm Hg) 2019                             [facility]



                          14:00:00                               [place]

 

             Weight       2019                             [facility]



                          14:30:00                               [place]

 

             Height       2019                             [facility]



                          14:30:00                               [place]

 

             Heart Rate   2019                             [facility]



                          14:30:00                               [place]

 

             Diastolic (mm Hg) 2019                             [facility]



                          14:30:00                               [place]

 

             Systolic (mm Hg) 2019                             [facility]



                          14:30:00                               [place]

 

             Weight       2019                             [facility]



                          14:15:00                               [place]

 

             Height       2019                             [facility]



                          14:15:00                               [place]

 

             Heart Rate   2019                             [facility]



                          14:15:00                               [place]

 

             Diastolic (mm Hg) 2019                             [facility]



                          14:15:00                               [place]

 

             Systolic (mm Hg) 2019                             [facility]



                          14:15:00                               [place]

 

             Weight       2019                             [facility]



                          15:00:00                               [place]

 

             Height       2019                             [facility]



                          15:00:00                               [place]

 

             Heart Rate   2019                             [facility]



                          15:00:00                               [place]

 

             Diastolic (mm Hg) 2019                             [facility]



                          15:00:00                               [place]

 

             Systolic (mm Hg) 2019                             [facility]



                          15:00:00                               [place]







Procedures







                Procedure       Date / Time Performed Performing Clinician Ginny tavarez

 

                POCT URINALYSIS 2020 00:00:00 Nelson Garcia [facility] of



                DIPSTICK                                        Medicine







Plan of Care







             Planned Activity Planned Date Details      Comments     Source

 

             Future Scheduled              COLON CANCER SCREENING:              

[facility]



             Test                      COLONOSCOPY [code = COLON              Me

dicine



                                       CANCER SCREENING:              



                                       COLONOSCOPY]              

 

             Future Scheduled              TETANUS SHOT (ADULT) [code           

   [facility]



             Test                      = TETANUS SHOT (ADULT)]              Medi

cine

 

             Future Scheduled              Diabetic foot examination            

  [facility]



             Test                      (regime/therapy) [code =              Med

icine



                                       [id number]]                

 

             Future Scheduled              ANNUAL DIABETIC RETINOPATHY          

    [facility]



             Test                      SCREENING [code = ANNUAL              Med

icine



                                       DIABETIC RETINOPATHY              



                                       SCREENING]                

 

             Future Scheduled              BMI FOLLOW UP PLAN [code =           

   [facility] of



             Test                      BMI FOLLOW UP PLAN]              Medicine

 

             Future Scheduled              HEPATITIS C SCREENING [code          

    [facility] of



             Test                      = HEPATITIS C SCREENING]              Med

icine

 

             Future Scheduled              HIV SCREENING [code = HIV            

  [facility] of



             Test                      SCREENING]                Medicine

 

             Future Scheduled              MEDICARE AWV (Initial)              B

[facility]



             Test                      [code = MEDICARE AWV              [facility]

e



                                       (Initial)]                

 

             Future Scheduled              FLU VACCINE > 6 MONTHS              B

[facility]



             Test                      [code = FLU VACCINE > 6              Medi

cine



                                       MONTHS]                   

 

             Future Scheduled              COLON CANCER SCREENING:              

[facility]



             Test                      COLONOSCOPY [code = COLON              Me

dicine



                                       CANCER SCREENING:              



                                       COLONOSCOPY]              

 

             Future Scheduled              TETANUS SHOT (ADULT) [code           

   [facility] of



             Test                      = TETANUS SHOT (ADULT)]              Medi

cine

 

             Future Scheduled              Diabetic foot examination            

  [facility]



             Test                      (regime/therapy) [code =              Med

icine



                                       [id number]]                

 

             Future Scheduled              ANNUAL DIABETIC RETINOPATHY          

    [facility]



             Test                      SCREENING [code = ANNUAL              Med

icine



                                       DIABETIC RETINOPATHY              



                                       SCREENING]                

 

             Future Scheduled              BMI FOLLOW UP PLAN [code =           

   [facility] of



             Test                      BMI FOLLOW UP PLAN]              Medicine

 

             Future Scheduled              HEPATITIS C SCREENING [code          

    [facility]



             Test                      = HEPATITIS C SCREENING]              Med

icine

 

             Future Scheduled              HIV SCREENING [code = HIV            

  [facility]



             Test                      SCREENING]                Medicine

 

             Future Scheduled              [facility] AW[facility] (Initial)              [facility]



             [facility]                      [code = MEDICARE AWV              [facility]

e



                                       (Initial)]                

 

             Future Scheduled              FLU VACCINE > 6 MONTHS              [facility]



             Test                      [code = FLU VACCINE > 6              Medi

cine



                                       MONTHS]                   

 

             Future Scheduled              ELECTROCARDIOGRAM COMPLETE           

   [facility]



             Test                      [code = 34320]              Medicine

 

             Future Scheduled              COLON CANCER SCREENING:              

[facility]                      COLONOSCOPY [code = COLON              Me

dicine



                                       CANCER SCREENING:              



                                       COLONOSCOPY]              

 

             Future Scheduled              TETANUS SHOT (ADULT) [code           

   [facility] of



             Test                      = TETANUS SHOT (ADULT)]              Medi

cine

 

             Future Scheduled              Diabetic foot examination            

  [facility]                      (regime/therapy) [code =              Med

icine



                                       [id number]]                

 

             Future Scheduled              ANNUAL DIABETIC RETINOPATHY          

    [facility]



             Test                      SCREENING [code = ANNUAL              Med

icine



                                       DIABETIC RETINOPATHY              



                                       SCREENING]                

 

             Future Scheduled              BMI FOLLOW UP PLAN [code =           

   [facility]



             Test                      BMI FOLLOW UP PLAN]              Medicine

 

             Future Scheduled              HEPATITIS C SCREENING [code          

    [facility]



             Test                      = HEPATITIS C SCREENING]              Med

icine

 

             Future Scheduled              HIV SCREENING [code = HIV            

  [facility]



             Test                      SCREENING]                Medicine

 

             Future Scheduled              MEDICARE AWV (Initial)              B

[facility]



             Test                      [code = MEDICARE AWV              Medicin

e



                                       (Initial)]                

 

             Future Scheduled              FLU VACCINE > 6 MONTHS              B

[facility]



             Test                      [code = FLU VACCINE > 6              Medi

cine



                                       MONTHS]                   







Encounters







        Start   End     Encounter Admission Attending Care    Care    Encounter 

Source



        Date/Time Date/Time Type    Type    Clinicians Facility Department ID   

   

 

        2021-10-06         [facility] [id number]

 [facility]



        08:00:52                         NELSON                         

 

        2021         Outpatient                 [id number]

[id number] Memoria



        15:47:18                                 [id number]

A l



                                                [id number] [phone]

 [place]



                                                [id number]     [id number]  

 

        2020         [facility]      [facility] [id number]

548 [facility]



        16:50:00                         ANDERSON                         

 

        2022 [facility]     [id number]

[address] [facility]



        00:00:00 00:00:00                 TESSA                 970     Met

angeline pelayo

 

        2021 [facility]         [facility]     064     [id number]

[address] [facility]



        00:00:00 00:00:00                 AMER                    519     [facility]

i



                                                                        st

 

        2021 Outpatient         CJ SMITH [facility]     2100

[id number] [facility]



        00:00:00 00:00:00                                         828     [facility]

i



                                                                        st

 

        2021-10-19 2021-10-19 Outpatient         CJ SMITH [facility]     2100

[id number] [facility]



        00:00:00 00:00:00                                         127     Method

i



                                                                        st

 

        2021 2021-10-04 Inpatient         CAYETANO, [facility]     064     [id number]

31 [facility]



        00:00:00 00:00:00                 AMER                    104     Method

i



                                                                        st

 

        2021 Outpatient         CJ SMITH [facility]     2100

[id number] [facility]



        00:00:00 00:00:00                                         067     Method

i



                                                                        st

 

        2021 Outpatient         CJ SMITH [facility]     2100

[id number] [facility]



        00:00:00 00:00:00                                         619     Method

i



                                                                        st

 

        2021 Inpatient         CAYETANO [facility]     012     [id number]

34 [facility]



        00:00:00 00:00:00                 AMER                    182     [facility]

i



                                                                        st

 

        2021 Outpatient         COOPER KEE [facility]     [id number]

39920 [facility]



        00:00:00 00:00:00                                         771     Method

i



                                                                        st

 

        2021 Outpatient         COOPER KEE [facility]     [id number]

15226 [facility]



        00:00:00 00:00:00                                         772     Method

i



                                                                        st

 

        2021 Outpatient         CJ SMITH [facility]     2100

[id number] [facility]



        00:00:00 00:00:00                                         523     Method

i



                                                                        st

 

        2021 Inpatient         CAYETANO [facility]     064     [id number]

34 [facility]



        00:00:00 00:00:00                 AMER                    479     Method

i



                                                                        st

 

        2021 Outpatient         CJ SMITH [facility]     2100

[id number] [facility]



        00:00:00 00:00:00                                         281     Method

i



                                                                        st

 

        2021 Outpatient         CJ SMITH [facility]     

[id number] [facility]



        00:00:00 00:00:00                                         773     Method

i



                                                                        st

 

        2021 Outpatient         CJ SMITH [facility]     

[id number] [facility]



        00:00:00 00:00:00                                         425     Method

i



                                                                        st

 

        2021 Outpatient         CJ SMITH [facility]     

[id number] [facility]



        00:00:00 00:00:00                                         070     Method

i



                                                                        st

 

        2021-04-15 2021-04-15 Outpatient         LECOOPER [facility]     [id number]

24899 [facility]



        00:00:00 00:00:00                                         770     Method

i



                                                                        st

 

        2021 Outpatient         KEELY [facility]     [id number]

[address] [facility]



        00:00:00 00:00:00                 ANALY                 764     Metho

di



                                                                        st

 

        2021 Outpatient         LECOOPER [facility]     [id number]

33396 [facility]



        00:00:00 00:00:00                                         033     Method

i



                                                                        st

 

        2021 Outpatient         LECOOPER [facility]     [id number]

31515 [facility]



        00:00:00 00:00:00                                         034     Method

i



                                                                        st

 

        2021 Outpatient         LECOOPER [facility]     [id number]

01269 [facility]



        00:00:00 00:00:00                                         036     Method

i



                                                                        st

 

        2021 Inpatient         GONSALO, [facility]     038     [id number]

334 [facility]



        00:00:00 00:00:00                 ARACELI                     569     Method

i



                                                                        st

 

        2021 Inpatient         CAYETANO, [facility]     064     [id number]

00 [facility]



        00:00:00 00:00:00                 AMER                    282     Method

i



                                                                        st

 

        2020-10-14 2020-10-14 Outpatient EL              SLE    SLE    [id number]

315 [facility]



        00:00:00 00:00:00                                                 

 

        2020 Outpatient EL              [facility]    [facility]    [id number]

538 [facility]



        00:00:00 00:00:00                                                 

 

        2020 Office          JANUSZ Gomez     [id number]

65 [facility]



        07:49:28 08:24:12 Visit           Luca    AMBULATOR 350.1.13.21         

College



                                                Y       0.2.7.2.686         of



                                                        [phone]         [facility]

narinder



                                                        300             e

 

        2020 Office          JANUSZ Chan     [id number]

[address] [facility]



        14:10:13 15:22:53 Visit           Jerome E  AMBULATOR 350.1.13.21         

College



                                                Y       0.2.7.2.686         of



                                                        [phone]         [facility]

narinder



                                                        300             e

 

        2020 Office          HEYDI Garcia     [id number]

53 [facility]



        16:04:19 15:22:42 Visit           Nelson E AMBULATOR 350.1.13.21       

  College



                                                Y       0.2.7.2.686         of



                                                        [phone]         [facility]

narinder



                                                        300             e







Results







           Test Description Test Time  Test Comments Results    Result Comments 

Source









                          SARS-CoV-2 (COVID-19) RNA [Presence] in Respiratory sp

ecimen by 2021 

22:15:03                                



                    DANIEL with probe detection                     









                      Test Item  Value      Reference Range Interpretation Comme

nts









             SARS-CoV-2 (COVID-19) RNA [Presence] in Respiratory Not detected No

t-Detected              



             specimen by DANIEL with probe detection (test code =                  

                      



             32423-0)                                            

 

             Whether patient is employed in a healthcare setting                

                        



             (test code = 48619-8)                                        

 

             Whether the patient has symptoms related to condition              

                          



             of interest (test code = 43339-4)                                  

      

 

             Patient was hospitalized because of this condition                 

                       



             (test code = 57948-4)                                        

 

             Whether the patient was admitted to intensive care unit            

                            



             (ICU) for condition of interest (test code = 75892-6)              

                          

 

             Whether patient resides in a congregate care setting               

                         



             (test code = 12128-1)                                        



SARS-CoV-2 (COVID-19) RNA [Presence] in Respiratory specimen by DANIEL with probe 
[id number] 02:45:32





             Test Item    Value        Reference Range Interpretation Comments

 

             SARS-CoV-2 (COVID-19) RNA Not detected Not-Detected              



             [Presence] in Respiratory                                        



             specimen by DANIEL with probe                                        



             detection (test code = 07599-6)                                    

    

 

             Whether patient is employed in a                                   

     



             healthcare setting (test code =                                    

    



             48066-5)                                            

 

             Whether the patient has symptoms                                   

     



             related to condition of interest                                   

     



             (test code = 19224-6)                                        

 

             Patient was hospitalized because                                   

     



             of this condition (test code =                                     

   



             57064-6)                                            

 

             Whether the patient was admitted                                   

     



             to intensive care unit (ICU) for                                   

     



             condition of interest (test code                                   

     



             = 84819-5)                                          

 

             Whether patient resides in a                                       

 



             congregate care setting (test                                      

  



             code = 43171-4)                                        



SARS-CoV-2 (COVID-19) RNA [Presence] in Respiratory specimen by DANIEL with probe 
[id number] 21:37:12





             Test Item    Value        Reference Range Interpretation Comments

 

             SARS-CoV-2 (COVID-19) RNA Not detected Not-Detected              



             [Presence] in Respiratory                                        



             specimen by DANIEL with probe                                        



             detection (test code = 50018-1)                                    

    

 

             Whether patient is employed in a                                   

     



             healthcare setting (test code =                                    

    



             96673-4)                                            

 

             Whether the patient has symptoms                                   

     



             related to condition of interest                                   

     



             (test code = 90189-2)                                        

 

             Patient was hospitalized because                                   

     



             of this condition (test code =                                     

   



             45684-5)                                            

 

             Whether the patient was admitted                                   

     



             to intensive care unit (ICU) for                                   

     



             condition of interest (test code                                   

     



             = 42470-8)                                          

 

             Whether patient resides in a                                       

 



             congregate care setting (test                                      

  



             code = 51007-7)                                        



SARS-CoV-2 (COVID-19) RNA [Presence] in Respiratory specimen by DANIEL with probe 
[id number] 01:23:52





             Test Item    Value        Reference Range Interpretation Comments

 

             SARS-CoV-2 (COVID-19) RNA Not detected Not-Detected              



             [Presence] in Respiratory                                        



             specimen by DANIEL with probe                                        



             detection (test code = 57708-9)                                    

    

 

             Whether patient is employed in a                                   

     



             healthcare setting (test code =                                    

    



             08385-9)                                            

 

             Whether the patient has symptoms                                   

     



             related to condition of interest                                   

     



             (test code = 18536-0)                                        

 

             Patient was hospitalized because                                   

     



             of this condition (test code =                                     

   



             97190-9)                                            

 

             Whether the patient was admitted                                   

     



             to intensive care unit (ICU) for                                   

     



             condition of interest (test code                                   

     



             = 92969-0)                                          

 

             Whether patient resides in a                                       

 



             congregate care setting (test                                      

  



             code = 82811-0)                                        



SARS-CoV-2 (COVID-19) RNA [Presence] in Respiratory specimen by DANIEL with probe 
[id number] 20:00:59





             Test Item    Value        Reference Range Interpretation Comments

 

             SARS-CoV-2 (COVID-19) RNA Not detected Not-Detected              



             [Presence] in Respiratory                                        



             specimen by DANIEL with probe                                        



             detection (test code = 49599-1)                                    

    



SARS-CoV-2 (COVID-19) RNA [Presence] in Respiratory specimen by DANIEL with probe 
[id number] 01:43:26





             Test Item    Value        Reference Range Interpretation Comments

 

             SARS-CoV-2 (COVID-19) RNA Not detected Not-Detected              



             [Presence] in Respiratory                                        



             specimen by DANIEL with probe                                        



             detection (test code = 13154-1)                                    

    



POCT-GLUCOSE [id number] 14:40:00





             Test Item    Value        Reference Range Interpretation Comments

 

             POC-GLUCOSE METER 135 mg/dL           H            : TESTED A

[facility]



             (Helijia) (test code =                                        [facility],



             1538)                                               [address]:



                                                                 /Techni

joe ID



                                                                 = [id number] for Mirna Dumas



[id number] 13:39:48Reason for exam:-&gt;ureteral stent placement
********************************************************[facility]        : 1960        Sex: 
M************************************************************Fluoroscopic unit 
utilized for a procedure performed in the OR.  No interpretation was requested. 
 Refer to the operative report for findings.  Refer to PACS for patient 
radiation dose information.POCT-GLUCOSE METER2020-10-15 10:55:00





             Test Item    Value        Reference Range Interpretation Comments

 

             POC-GLUCOSE METER 125 mg/dL           H            : TESTED A

T [facility] 6720



             (Helijia) (test code =                                        [facility],



             1538)                                               [address]:



                                                                 /Techni

joe ID



                                                                 = [id number] for SAM

VINOD LOVE



BUN AND CREATININE [id number] 10:52:00





             Test Item    Value        Reference Range Interpretation Comments

 

             BLOOD UREA NITROGEN 40 mg/dL     7-21         H            



             (Smart LunchesAKER) (test code                                        



             = 354)                                              

 

             CREATININE (BEAKER) 2.30 mg/dL   0.57-1.25    H            



             (test code = 358)                                        

 

             BUN/CREAT RATIO 17                                     Unable to ca

lculate



             (BEAKER) (test code                                        due to n

on-numeric



             = [id number])                                        results

 

             EGFR (BEAKER) (test 29 mL/min/1.73                           ESTIMA

MORENA GFR IS



             code = 1092) sq m                                   NOT AS ACCURATE

 AS



                                                                 CREATININE



                                                                 CLEARANCE IN



                                                                 PREDICTING



                                                                 GLOMERULAR



                                                                 FILTRATION RATE

.



                                                                 ESTIMATED GFR I

S



                                                                 NOT APPLICABLE 

FOR



                                                                 DIALYSIS PATIEN

TS.



 ID - EMILIANA MELECTROLYTES2020-10-15 10:40:00





             Test Item    Value        Reference Range Interpretation Comments

 

             SODIUM (BEAKER) (test code = 381) 139 meq/L    136-145             

      

 

             POTASSIUM (BEAKER) (test code = 5.0 meq/L    3.5-5.1               

    



             379)                                                

 

             CHLORIDE (BEAKER) (test code = 382) 104 meq/L                

        

 

             CO2 (BEAKER) (test code = 355) 25 meq/L     22-29                  

   



 ID - EMILIANA [id number] 12:08:00





             Test Item    Value        Reference Range Interpretation Comments

 

             POTASSIUM (BEAKER) (test code = 5.9 meq/L    3.5-5.1      H        

    



             379)                                                



 ID - NTPBASIC METABOLIC [id number] 11:25:00





             Test Item    Value        Reference Range Interpretation Comments

 

             SODIUM (BEAKER) 136 meq/L    136-145                   



             (test code = 381)                                        

 

             POTASSIUM (BEAKER)                                        Unable to

 obtain



             (test code = 379)                                        result due

 to



                                                                 HEMOLYSIS.

 

             CHLORIDE (BEAKER) 106 meq/L                        



             (test code = 382)                                        

 

             CO2 (BEAKER) (test 22 meq/L     22-29                     



             code = 355)                                         

 

             BLOOD UREA NITROGEN 39 mg/dL     7-21         H            



             (BEAKER) (test code                                        



             = 354)                                              

 

             CREATININE (BEAKER) 2.32 mg/dL   0.57-1.25    H            Specimen

 moderately



             (test code = 358)                                        hemolyzed

 

             GLUCOSE RANDOM 105 mg/dL                        



             (BEAKER) (test code                                        



             = 652)                                              

 

             CALCIUM (BEAKER) 9.1 mg/dL    8.4-10.2                  



             (test code = 697)                                        

 

             EGFR (BEAKER) (test 29 mL/min/1.73                           ESTIMA

MORENA GFR IS



             code = 1092) sq m                                   NOT AS ACCURATE

 AS



                                                                 CREATININE



                                                                 CLEARANCE IN



                                                                 PREDICTING



                                                                 GLOMERULAR



                                                                 FILTRATION RATE

.



                                                                 ESTIMATED GFR I

S



                                                                 NOT APPLICABLE 

FOR



                                                                 DIALYSIS PATIEN

TS.



 ID - LAPOCT-GLUCOSE [id number] 09:43:00





             Test Item    Value        Reference Range Interpretation Comments

 

             POC-GLUCOSE METER 110 mg/dL                        : TESTED A

T [facility] [address]



             (BEAKER) (test code =                                        [facility],



             1538)                                               [address]:



                                                                 /Techni

joe ID



                                                                 = [id number] for PITO SHELBY



POCT URINALYSIS [id number] 00:00:00





             Test Item    Value        Reference Range Interpretation Comments

 

             COLOR UA (test code = 5778-6) Yellow       YELLOW/STRAW            

  

 

             CLARITY UA (test code = 91039-6) Clear        CLEAR                

     

 

             GLUCOSE UA (test code = 5792-7) Negative     NEGATIVE              

    

 

             BILIRUBIN UA (test code = 5770-3) Negative     NEGATIVE            

      

 

             KETONES UA (test code = 59661-3) Negative     NEGATIVE             

     

 

             SPECIFIC GRAVITY UA (test code =              1.005-1.035          

     



             5811-5)                                             

 

             BLOOD UA (test code = 5794-3) Moderate 2+  NEGATIVE                

  

 

             PH UA (test code = 5803-2)              5-9                       

 

             PROTEIN UA (test code = 5804-0) 2+           NEGATIVE              

    

 

             UROBILINOGEN UA (test code = 0.02 E.U/DL  NORMAL MG/DL             

 



             5818-0)                                             

 

             LEUKOCYTE ESTERASE UA (test code Negative     NEGATIVE             

     



             = 5799-2)                                           

 

             NITRITE UA (test code = 5802-4) Negative     NEGATIVE              

    

 

             REDUCING SUBSTANCES URINE (test                                    

    



             code = 36781-6)                                        



[id number]





             Test Item    Value        Reference Range Interpretation Comments

 

             CULTURE (BEAKER) (test No growth in 5 days                         

  



             code = 1095)                                        



BLOOD [id number] 09:00:00





             Test Item    Value        Reference Range Interpretation Comments

 

             CULTURE (BEAKER) (test No growth in 5 days                         

  



             code = 1095)                                        



[id number] 12:05:00





             Test Item    Value        Reference Range Interpretation Comments

 

             PROSTATE SPECIFIC ANTIGEN (BEAKER) 3.6 ng/mL    0.0-4.0            

       



             (test code = 844)                                        



 ID - NTPPOCT-GLUCOSE [id number] 11:57:00





             Test Item    Value        Reference Range Interpretation Comments

 

             POC-GLUCOSE METER 113 mg/dL           H            : TESTED A

T [facility] [address]



             (BEAKER) (test code =                                        [facility],



             1538)                                               [address]:



                                                                 /Techni

joe ID



                                                                 = [id number] for PEÑA MARINA



BASIC METABOLIC [id number] 11:52:00





             Test Item    Value        Reference Range Interpretation Comments

 

             SODIUM (BEAKER) 138 meq/L    136-145                   



             (test code = 381)                                        

 

             POTASSIUM (BEAKER) 5.1 meq/L    3.5-5.1                   



             (test code = 379)                                        

 

             CHLORIDE (BEAKER) 108 meq/L           H            



             (test code = 382)                                        

 

             CO2 (BEAKER) (test 23 meq/L     22-29                     



             code = 355)                                         

 

             BLOOD UREA NITROGEN 29 mg/dL     7-21         H            



             (BEAKER) (test code                                        



             = 354)                                              

 

             CREATININE (BEAKER) 2.34 mg/dL   0.57-1.25    H            



             (test code = 358)                                        

 

             GLUCOSE RANDOM 125 mg/dL           H            



             (BEAKER) (test code                                        



             = 652)                                              

 

             CALCIUM (BEAKER) 8.6 mg/dL    8.4-10.2                  



             (test code = 697)                                        

 

             EGFR (BEAKER) (test 28 mL/min/1.73                           ESTIMA

MORENA GFR IS



             code = 1092) sq m                                   NOT AS ACCURATE

 AS



                                                                 CREATININE



                                                                 CLEARANCE IN



                                                                 PREDICTING



                                                                 GLOMERULAR



                                                                 FILTRATION RATE

.



                                                                 ESTIMATED GFR I

S



                                                                 NOT APPLICABLE 

FOR



                                                                 DIALYSIS PATIEN

TS.



 ID - [id number] 11:47:00





             Test Item    Value        Reference Range Interpretation Comments

 

             PHOSPHORUS (BEAKER) (test code = 2.9 mg/dL    2.3-4.7              

     



             604)                                                



 ID - [id number] 11:47:00





             Test Item    Value        Reference Range Interpretation Comments

 

             MAGNESIUM (BEAKER) (test code = 2.2 mg/dL    1.6-2.6               

    



             627)                                                



 ID - NTPCBC W/PLT COUNT &amp; AUTO [id number] 11:30:00





             Test Item    Value        Reference Range Interpretation Comments

 

             WHITE BLOOD CELL COUNT (BEAKER) 4.6 K/ L     3.5-10.5              

    



             (test code = 775)                                        

 

             RED BLOOD CELL COUNT (BEAKER) 3.87 M/ L    4.63-6.08    L          

  



             (test code = 761)                                        

 

             HEMOGLOBIN (BEAKER) (test code = 12.7 GM/DL   13.7-17.5    L       

     



             410)                                                

 

             HEMATOCRIT (BEAKER) (test code = 40.6 %       40.1-51.0            

     



             411)                                                

 

             MEAN CORPUSCULAR VOLUME (BEAKER) 104.9 fL     79.0-92.2    H       

     



             (test code = 753)                                        

 

             MEAN CORPUSCULAR HEMOGLOBIN 32.8 pg      25.7-32.2    H            



             (BEAKER) (test code = 751)                                        

 

             MEAN CORPUSCULAR HEMOGLOBIN CONC 31.3 GM/DL   32.3-36.5    L       

     



             (BEAKER) (test code = 752)                                        

 

             RED CELL DISTRIBUTION WIDTH 14.9 %       11.6-14.4    H            



             (BEAKER) (test code = 412)                                        

 

             PLATELET COUNT (BEAKER) (test 192 K/CU MM  150-450                 

  



             code = 756)                                         

 

             MEAN PLATELET VOLUME (BEAKER) 10.5 fL      9.4-12.4                

  



             (test code = 754)                                        

 

             NUCLEATED RED BLOOD CELLS 0 /100 WBC   0-0                       



             (BEAKER) (test code = 413)                                        

 

             NEUTROPHILS RELATIVE PERCENT 63 %                                  

 



             (BEAKER) (test code = 429)                                        

 

             LYMPHOCYTES RELATIVE PERCENT 20 %                                  

 



             (BEAKER) (test code = 430)                                        

 

             MONOCYTES RELATIVE PERCENT 9 %                                    



             (BEAKER) (test code = 431)                                        

 

             EOSINOPHILS RELATIVE PERCENT 8 %                                   

 



             (BEAKER) (test code = 432)                                        

 

             BASOPHILS RELATIVE PERCENT 1 %                                    



             (BEAKER) (test code = 437)                                        

 

             NEUTROPHILS ABSOLUTE COUNT 2.89 K/ L    1.78-5.38                 



             (BEAKER) (test code = 670)                                        

 

             LYMPHOCYTES ABSOLUTE COUNT 0.91 K/ L    1.32-3.57    L            



             (BEAKER) (test code = 414)                                        

 

             MONOCYTES ABSOLUTE COUNT (BEAKER) 0.41 K/ L    0.30-0.82           

      



             (test code = 415)                                        

 

             EOSINOPHILS ABSOLUTE COUNT 0.35 K/ L    0.04-0.54                 



             (BEAKER) (test code = 416)                                        

 

             BASOPHILS ABSOLUTE COUNT (BEAKER) 0.04 K/ L    0.01-0.08           

      



             (test code = 417)                                        

 

             IMMATURE GRANULOCYTES-RELATIVE 0 %          0-1                    

   



             PERCENT (BEAKER) (test code =                                      

  



             2801)                                               



POCT-GLUCOSE [id number] 07:42:00





             Test Item    Value        Reference Range Interpretation Comments

 

             POC-GLUCOSE METER 103 mg/dL                        : TESTED A

T [facility] [address]



             (BEAKER) (test code =                                        [place],



             1538)                                               [address]:



                                                                 /Techni

joe ID



                                                                 = [id number] for PEÑA MARINA



POCT-GLUCOSE [id number] 21:06:00





             Test Item    Value        Reference Range Interpretation Comments

 

             POC-GLUCOSE METER 168 mg/dL           H            : TESTED A

T [facility] [address]



             (BEAKER) (test code                                        [facility],



             = 1538)                                             [address]:



                                                                 /Techni

joe ID =



                                                                 [id number] for AMBER RUSHING



POCT-GLUCOSE [id number] 17:02:00





             Test Item    Value        Reference Range Interpretation Comments

 

             POC-GLUCOSE METER 124 mg/dL           H            : TESTED A

T [facility] [address]



             (BEAKER) (test code =                                        [facility],



             1538)                                               [address]:



                                                                 /Techni

oje ID



                                                                 = [id number] for АНДРЕЙ CANALES



POCT-GLUCOSE [id number] 12:31:00





             Test Item    Value        Reference Range Interpretation Comments

 

             POC-GLUCOSE METER 112 mg/dL           H            : TESTED A

T [facility] [address]



             (BEAKER) (test code =                                        [facility],



             1538)                                               [address]:



                                                                 /Techni

joe ID



                                                                 = [id number] for DA

VIS,



                                                                 KEYAIRA



POCT-GLUCOSE [id number] 08:32:00





             Test Item    Value        Reference Range Interpretation Comments

 

             POC-GLUCOSE METER 117 mg/dL           H            : TESTED A

T [facility] [address]



             (BEAKER) (test code =                                        [facility],



             1538)                                               [address]:



                                                                 /Techni

joe ID



                                                                 = [id number] for DA

VIS,



                                                                 KEYAIRA



COMPREHENSIVE METABOLIC [id number] 05:26:00





             Test Item    Value        Reference Range Interpretation Comments

 

             TOTAL PROTEIN 6.6 gm/dL    6.0-8.3                   



             (BEAKER) (test code =                                        



             770)                                                

 

             ALBUMIN (BEAKER) 3.6 g/dL     3.5-5.0                   



             (test code = 1145)                                        

 

             ALKALINE PHOSPHATASE 41 U/L                           



             (BEAKER) (test code =                                        



             346)                                                

 

             BILIRUBIN TOTAL 0.6 mg/dL    0.2-1.2                   



             (BEAKER) (test code =                                        



             377)                                                

 

             SODIUM (BEAKER) (test 140 meq/L    136-145                   



             code = 381)                                         

 

             POTASSIUM (BEAKER) 4.8 meq/L    3.5-5.1                   



             (test code = 379)                                        

 

             CHLORIDE (BEAKER) 111 meq/L           H            



             (test code = 382)                                        

 

             CO2 (BEAKER) (test 21 meq/L     22-29        L            



             code = 355)                                         

 

             BLOOD UREA NITROGEN 37 mg/dL     7-21         H            



             (BEAKER) (test code =                                        



             354)                                                

 

             CREATININE (BEAKER) 2.74 mg/dL   0.57-1.25    H            



             (test code = 358)                                        

 

             GLUCOSE RANDOM 111 mg/dL           H            



             (BEAKER) (test code =                                        



             652)                                                

 

             CALCIUM (BEAKER) 8.2 mg/dL    8.4-10.2     L            



             (test code = 697)                                        

 

             AST (SGOT) (BEAKER) 28 U/L       5-34                      



             (test code = 353)                                        

 

             ALT (SGPT) (BEAKER) 35 U/L       6-55                      



             (test code = 347)                                        

 

             EGFR (BEAKER) (test 24 mL/min/1.73                           ESTIMA

MORENA GFR IS



             code = 1092) sq m                                   NOT AS ACCURATE

 AS



                                                                 CREATININE



                                                                 CLEARANCE IN



                                                                 PREDICTING



                                                                 GLOMERULAR



                                                                 FILTRATION RATE

.



                                                                 ESTIMATED GFR I

S



                                                                 NOT APPLICABLE 

FOR



                                                                 DIALYSIS PATIEN

TS.



 ID - EMILIANA MCBC W/PLT COUNT &amp; AUTO [id number] 04:41:00





             Test Item    Value        Reference Range Interpretation Comments

 

             WHITE BLOOD CELL COUNT (BEAKER) 5.6 K/ L     3.5-10.5              

    



             (test code = 775)                                        

 

             RED BLOOD CELL COUNT (BEAKER) 3.78 M/ L    4.63-6.08    L          

  



             (test code = 761)                                        

 

             HEMOGLOBIN (BEAKER) (test code = 12.7 GM/DL   13.7-17.5    L       

     



             410)                                                

 

             HEMATOCRIT (BEAKER) (test code = 40.1 %       40.1-51.0            

     



             411)                                                

 

             MEAN CORPUSCULAR VOLUME (BEAKER) 106.1 fL     79.0-92.2    H       

     



             (test code = 753)                                        

 

             MEAN CORPUSCULAR HEMOGLOBIN 33.6 pg      25.7-32.2    H            



             (BEAKER) (test code = 751)                                        

 

             MEAN CORPUSCULAR HEMOGLOBIN CONC 31.7 GM/DL   32.3-36.5    L       

     



             (BEAKER) (test code = 752)                                        

 

             RED CELL DISTRIBUTION WIDTH 15.2 %       11.6-14.4    H            



             (BEAKER) (test code = 412)                                        

 

             PLATELET COUNT (BEAKER) (test 187 K/CU MM  150-450                 

  



             code = 756)                                         

 

             MEAN PLATELET VOLUME (BEAKER) 10.6 fL      9.4-12.4                

  



             (test code = 754)                                        

 

             NUCLEATED RED BLOOD CELLS 0 /100 WBC   0-0                       



             (BEAKER) (test code = 413)                                        

 

             NEUTROPHILS RELATIVE PERCENT 66 %                                  

 



             (BEAKER) (test code = 429)                                        

 

             LYMPHOCYTES RELATIVE PERCENT 19 %                                  

 



             (BEAKER) (test code = 430)                                        

 

             MONOCYTES RELATIVE PERCENT 9 %                                    



             (BEAKER) (test code = 431)                                        

 

             EOSINOPHILS RELATIVE PERCENT 5 %                                   

 



             (BEAKER) (test code = 432)                                        

 

             BASOPHILS RELATIVE PERCENT 1 %                                    



             (BEAKER) (test code = 437)                                        

 

             NEUTROPHILS ABSOLUTE COUNT 3.71 K/ L    1.78-5.38                 



             (BEAKER) (test code = 670)                                        

 

             LYMPHOCYTES ABSOLUTE COUNT 1.06 K/ L    1.32-3.57    L            



             (BEAKER) (test code = 414)                                        

 

             MONOCYTES ABSOLUTE COUNT (BEAKER) 0.48 K/ L    0.30-0.82           

      



             (test code = 415)                                        

 

             EOSINOPHILS ABSOLUTE COUNT 0.27 K/ L    0.04-0.54                 



             (BEAKER) (test code = 416)                                        

 

             BASOPHILS ABSOLUTE COUNT (BEAKER) 0.05 K/ L    0.01-0.08           

      



             (test code = 417)                                        

 

             IMMATURE GRANULOCYTES-RELATIVE 0 %          0-1                    

   



             PERCENT (BEAKER) (test code =                                      

  



             2801)                                               



POCT-GLUCOSE [id number] 21:12:00





             Test Item    Value        Reference Range Interpretation Comments

 

             POC-GLUCOSE METER 173 mg/dL           H            : TESTED A

T [facility] [address]



             (BEAKER) (test code                                        [facility],



             = 1538)                                             [address]:



                                                                 /Techni

joe ID =



                                                                 [id number] for OZZ,



                                                                 AMBER



POCT-GLUCOSE [id number] 18:19:00





             Test Item    Value        Reference Range Interpretation Comments

 

             POC-GLUCOSE METER 144 mg/dL           H            : TESTED A

T [facility] [address]



             (BEAKER) (test code =                                        [facility],



             1538)                                               [address]:



                                                                 /Techni

joe ID



                                                                 = [id number] for CA

STRO,



                                                                 PITA



FL, FLUORO, NON-SPECIFIC, UP TO 1 [id number] 15:56:00Reason for exam:-
&gt;left ureteral stoneFINAL REPORT PATIENT ID:   [id number] A fluoroscopic unit 
was utilized for a procedure performed in the operating room. No interpretation 
was requested. Please refer to the operative report regarding findings. Please 
refer to PACS for patient radiation dose information. Signed: Asuncion Sood 
MDReport Verified Date/Time:  2020 15:56:35 Reading Location:  [facility] Reading Room  Electronically signed by: ASUNCION SOOD MD on 2020 03:56 PMURINALYSIS W/ [id number] 15:52:00





             Test Item    Value        Reference Range Interpretation Comments

 

             COLOR (BEAKER) (test code = 470) Pink                              

     

 

             CLARITY (BEAKER) (test code = 469) Cloudy                          

       

 

             SPECIFIC GRAVITY UA (BEAKER) (test 1.030        1.001-1.035        

       



             code = 468)                                         

 

             PH UA (BEAKER) (test code = 467) 5.5          5.0-8.0              

     

 

             PROTEIN UA (BEAKER) (test code = 70 mg/dL     Negative     A       

     



             464)                                                

 

             GLUCOSE UA (BEAKER) (test code = 200 mg/dL    Negative     A       

     



             365)                                                

 

             KETONES UA (BEAKER) (test code = Negative     Negative             

     



             371)                                                

 

             BILIRUBIN UA (BEAKER) (test code = Negative     Negative           

       



             462)                                                

 

             BLOOD UA (BEAKER) (test code = 461) Large        Negative     A    

        

 

             NITRITE UA (BEAKER) (test code = Negative     Negative             

     



             465)                                                

 

             LEUKOCYTE ESTERASE UA (BEAKER) Large        Negative     A         

   



             (test code = 466)                                        

 

             UROBILINOGEN UA (BEAKER) (test code 0.2 mg/dL    0.2-1.0           

        



             = 463)                                              

 

             RBC UA (BEAKER) (test code = 519) 73 /HPF                          

      

 

             WBC UA (BEAKER) (test code = 520) 149 /HPF                         

      

 

             MUCUS (BEAKER) (test code = 1574) Rare                             

      

 

             SOURCE(BEAKER) (test code = 2795)                                  

      



 ID - [auto] ID - techPOCT-GLUCOSE [id number] 11:43:00





             Test Item    Value        Reference Range Interpretation Comments

 

             POC-GLUCOSE METER 125 mg/dL           H            : TESTED A

T [facility] [address]



             (BEAKER) (test code =                                        [place],



             1538)                                               [id number]:



                                                                 /Techni

joe ID



                                                                 = [id number] for CHEL HOWELL



HEMOGLOBIN [id number] 08:01:00





             Test Item    Value        Reference Range Interpretation Comments

 

             HEMOGLOBIN A1C (BEAKER) (test code = 7.1 %        4.3-6.1      H   

         



             368)                                                



POCT-GLUCOSE [id number] 07:55:00





             Test Item    Value        Reference Range Interpretation Comments

 

             POC-GLUCOSE METER 115 mg/dL           H            : TESTED A

T [facility] [address]



             (BEAKER) (test code =                                        [place],



             1538)                                               [address]:



                                                                 /Techni

joe ID



                                                                 = [id number] for CHEL HOWELL



COMPREHENSIVE METABOLIC [id number] 06:52:00





             Test Item    Value        Reference Range Interpretation Comments

 

             TOTAL PROTEIN 7.1 gm/dL    6.0-8.3                   



             (BEAKER) (test code =                                        



             770)                                                

 

             ALBUMIN (BEAKER) 3.8 g/dL     3.5-5.0                   



             (test code = 1145)                                        

 

             ALKALINE PHOSPHATASE 43 U/L                           



             (BEAKER) (test code =                                        



             346)                                                

 

             BILIRUBIN TOTAL 0.6 mg/dL    0.2-1.2                   



             (BEAKER) (test code =                                        



             377)                                                

 

             SODIUM (BEAKER) (test 137 meq/L    136-145                   



             code = 381)                                         

 

             POTASSIUM (BEAKER) 5.2 meq/L    3.5-5.1      H            



             (test code = 379)                                        

 

             CHLORIDE (BEAKER) 108 meq/L           H            



             (test code = 382)                                        

 

             CO2 (BEAKER) (test 22 meq/L     22-29                     



             code = 355)                                         

 

             BLOOD UREA NITROGEN 46 mg/dL     7-21         H            



             (BEAKER) (test code =                                        



             354)                                                

 

             CREATININE (BEAKER) 3.07 mg/dL   0.57-1.25    H            



             (test code = 358)                                        

 

             GLUCOSE RANDOM 127 mg/dL           H            



             (BEAKER) (test code =                                        



             652)                                                

 

             CALCIUM (BEAKER) 8.6 mg/dL    8.4-10.2                  



             (test code = 697)                                        

 

             AST (SGOT) (BEAKER) 35 U/L       5-34         H            



             (test code = 353)                                        

 

             ALT (SGPT) (BEAKER) 38 U/L       6-55                      



             (test code = 347)                                        

 

             EGFR (BEAKER) (test 21 mL/min/1.73                           ESTIMA

MORENA GFR IS



             code = 1092) sq m                                   NOT AS ACCURATE

 AS



                                                                 CREATININE



                                                                 CLEARANCE IN



                                                                 PREDICTING



                                                                 GLOMERULAR



                                                                 FILTRATION RATE

.



                                                                 ESTIMATED GFR I

S



                                                                 NOT APPLICABLE 

FOR



                                                                 DIALYSIS PATIEN

TS.



 ID - PIAYA [id number] 06:32:00





             Test Item    Value        Reference Range Interpretation Comments

 

             PHOSPHORUS (BEAKER) (test code = 3.8 mg/dL    2.3-4.7              

     



             604)                                                



 ID - NICKY [id number] 06:32:00





             Test Item    Value        Reference Range Interpretation Comments

 

             MAGNESIUM (BEAKER) (test code = 2.3 mg/dL    1.6-2.6               

    



             627)                                                



 ID - NICKY LCALCIUM, [id number] 06:24:00





             Test Item    Value        Reference Range Interpretation Comments

 

             CALCIUM IONIZED (BEAKER) (test 1.11 mmol/L  1.12-1.27    L         

   



             code = 698)                                         

 

             PH, BLOOD (BEAKER) (test code = 7.26                               

    



             1810)                                               



B-TYPE NATRIURETIC FACTOR (BNP)2020 05:29:00





             Test Item    Value        Reference Range Interpretation Comments

 

             B-TYPE NATRIURETIC PEPTIDE (BEAKER) 90 pg/mL     0-100             

        



             (test code = 700)                                        



 ID Jenny SAUNDERS LCBC W/PLT COUNT &amp; AUTO [id number] 05:06:00





             Test Item    Value        Reference Range Interpretation Comments

 

             WHITE BLOOD CELL COUNT (BEAKER) 6.9 K/ L     3.5-10.5              

    



             (test code = 775)                                        

 

             RED BLOOD CELL COUNT (BEAKER) 4.06 M/ L    4.63-6.08    L          

  



             (test code = 761)                                        

 

             HEMOGLOBIN (BEAKER) (test code = 13.3 GM/DL   13.7-17.5    L       

     



             410)                                                

 

             HEMATOCRIT (BEAKER) (test code = 42.4 %       40.1-51.0            

     



             411)                                                

 

             MEAN CORPUSCULAR VOLUME (BEAKER) 104.4 fL     79.0-92.2    H       

     



             (test code = 753)                                        

 

             MEAN CORPUSCULAR HEMOGLOBIN 32.8 pg      25.7-32.2    H            



             (BEAKER) (test code = 751)                                        

 

             MEAN CORPUSCULAR HEMOGLOBIN CONC 31.4 GM/DL   32.3-36.5    L       

     



             (BEAKER) (test code = 752)                                        

 

             RED CELL DISTRIBUTION WIDTH 15.0 %       11.6-14.4    H            



             (BEAKER) (test code = 412)                                        

 

             PLATELET COUNT (BEAKER) (test 220 K/CU MM  150-450                 

  



             code = 756)                                         

 

             MEAN PLATELET VOLUME (BEAKER) 10.5 fL      9.4-12.4                

  



             (test code = 754)                                        

 

             NUCLEATED RED BLOOD CELLS 0 /100 WBC   0-0                       



             (BEAKER) (test code = 413)                                        

 

             NEUTROPHILS RELATIVE PERCENT 69 %                                  

 



             (BEAKER) (test code = 429)                                        

 

             LYMPHOCYTES RELATIVE PERCENT 18 %                                  

 



             (BEAKER) (test code = 430)                                        

 

             MONOCYTES RELATIVE PERCENT 10 %                                   



             (BEAKER) (test code = 431)                                        

 

             EOSINOPHILS RELATIVE PERCENT 2 %                                   

 



             (BEAKER) (test code = 432)                                        

 

             BASOPHILS RELATIVE PERCENT 1 %                                    



             (BEAKER) (test code = 437)                                        

 

             NEUTROPHILS ABSOLUTE COUNT 4.71 K/ L    1.78-5.38                 



             (BEAKER) (test code = 670)                                        

 

             LYMPHOCYTES ABSOLUTE COUNT 1.23 K/ L    1.32-3.57    L            



             (BEAKER) (test code = 414)                                        

 

             MONOCYTES ABSOLUTE COUNT (BEAKER) 0.70 K/ L    0.30-0.82           

      



             (test code = 415)                                        

 

             EOSINOPHILS ABSOLUTE COUNT 0.15 K/ L    0.04-0.54                 



             (BEAKER) (test code = 416)                                        

 

             BASOPHILS ABSOLUTE COUNT (BEAKER) 0.04 K/ L    0.01-0.08           

      



             (test code = 417)                                        

 

             IMMATURE GRANULOCYTES-RELATIVE 0 %          0-1                    

   



             PERCENT (BEAKER) (test code =                                      

  



             2801)                                               



CT, [id number] 03:20:00Addendum BeginsREPORT STATUS:A PATIENT ID:   
[id number] Correction to findings section voice-recognition error which does not 
substantively change the impression: Kidneys/ureters: Mild left hydronephrosis 
with 9 x 9 x 10 mm calculus within the left ureteropelvic junction.  
Nonobstructing right renal calculus, 6 mm, present. Signed: Zeeshan Bender 
MDReport Verified Date/Time:  2020 03:20:36 Addendum EndsFINAL REPORT 
PATIENT ID:   [id number] CT, ABDOMEN \T\ PELVIS, WITHOUT IV CONTRAST CLINICAL 
HISTORY: Abdominal pain, acute, nonlocalizedevaluation of ureteropelvic stone 
Technique: Multiple axial images of the abdomen and pelvis were performed 
without contrast. Coronal and sagittal reformats obtained. Oral contrast was not
 administered. This exam was performed according to our departmental dose-op
timization program, which includes automated exposure control, adjustment of the
 mA and/or kV according to patient size and/or use of the iterative 
reconstruction technique. COMPARISON: None. FINDINGS:LOWER CHEST: Left greater 
than right subsegmental consolidations are present. LIVER: No acute findings
.BILIARY SYSTEM: No abnormal ductal dilatation.PANCREAS: No acute findings. 
SPLEEN: No acute findings.ADRENAL GLANDS: Unremarkable.KIDNEYS URETERS: Mild 
left hydronephrosis with 9 x 9 x 10 mm calculus within the left ureteropelvic 
junction. Nonobstructing right ureteral length, 6 mm present.URINARY BLADDER: 
Collapsed around a Ariza catheter.REPRODUCTIVE ORGANS: No acute findings. 
GASTROINTESTINAL/MESENTERY: No bowel obstruction or abnormal wall thickening. No
 evidence of appendicitis. PERITONEUM/RETROPERITONEUM: Nonspecific pericaval 2 
cm lymph node. VESSELS: No acute findings. Atherosclerotic calcifications 
present. LYMPH NODES: No abdominal or pelvic lymphadenopathy.SOFT TISSUES: No 
acute findings.BONES: L1-L5 fixation hardware with laminectomy defect. L5-S1 
degenerative disc disease with vacuum phenomena and high-grade bilateral neural 
foraminal stenosis. Other: None  IMPRESSION:Mild left hydronephrosis with left 
ureteropelvic junction 10 x 9 x 9 mm calculus. Prominent pericaval lymph node, l
ikely reactive. Left lung base consolidation likely atelectasis but pneumonia 
should be excluded clinically. Signed: Zeeshan Bender MDReport Verified 
Date/Time:  2020 03:00:56     Electronicallysigned by: ZEESHAN BENDER MD on 2020 03:20 [id number] 02:11:00





             Test Item    Value        Reference Range Interpretation Comments

 

             SODIUM (BEAKER) (test code = 381) 138 meq/L    136-145             

      

 

             POTASSIUM (BEAKER) (test code = 5.1 meq/L    3.5-5.1               

    



             379)                                                

 

             CHLORIDE (BEAKER) (test code = 382) 107 meq/L                

        

 

             CO2 (BEAKER) (test code = 355) 21 meq/L     22-29        L         

   



 ID - PIAYA LRAD, CHEST, 1 VIEW, NON [id number] 01:42:00Reason for 
exam:-&gt;edemaShould this be performed at the bedside?-&gt;YesFINAL REPORT 
PATIENT ID:   [id number] RAD, CHEST, 1 VIEW, NON DEPT INDICATION: edema 
COMPARISON: May 21, 2006 FINDINGS: Portable frontal view of the chest.   
IMPRESSION: Elevated left hemidiaphragm with basilar opacities suggestive of 
atelectasis. Superimposed pneumonia should be excluded clinically. Noovert 
pulmonary edema. Enlarged cardiac silhouette is stable. Stable mediastinal 
contours. No pneumothorax. Blunted left costophrenic angle without large 
effusion. Signed: Zeeshan Bender MDReport Verified Date/Time:  2020 
01:42:20     Electronically signed by: ZEESHAN BENDER MD on 2020 
01:42 AMURIC [id number] 01:02:00





             Test Item    Value        Reference Range Interpretation Comments

 

             URIC ACID (BEAKER) (test code = 5.3 mg/dL    2.6-7.2               

    



             773)                                                



 ID - CARMINEAYA LPTH, [id number] 23:49:00





             Test Item    Value        Reference Range Interpretation Comments

 

             PARATHYROID HORMONE INTACT 99.7 pg/mL   8.5-72.5     H            



             (BEAKER) (test code = 577)                                        



 ID - CARMINENISSA LPOCT-GLUCOSE [id number] 23:31:00





             Test Item    Value        Reference Range Interpretation Comments

 

             POC-GLUCOSE METER 83 mg/dL                         : TESTED A

T [facility] [address]



             (BEAKER) (test code =                                        [place] [facility],



             1538)                                               [address]:



                                                                 /Techni

joe ID =



                                                                 [id number] for CHELLE ROCHA



CREATININE, RANDOM [id number] 21:54:00





             Test Item    Value        Reference Range Interpretation Comments

 

             CREATININE URINE (BEAKER) (test 145.5 mg/dL                        

    



             code = 375)                                         



Reference Range: No NormalsOperator ID - DBPROTEIN, RANDOM [id number] 
21:54:00





             Test Item    Value        Reference Range Interpretation Comments

 

             PROTEIN, URINE (BEAKER) (test code = 31 mg/dL     0-14         H   

         



             1569)                                               



 ID - DBSODIUM, RANDOM [id number] 21:54:00





             Test Item    Value        Reference Range Interpretation Comments

 

             SODIUM URINE (BEAKER) (test code = 61 meq/L                        

       



             243)                                                



Reference Range: No NormalsOperator ID - DBLIPID [id number] 21:45:00





             Test Item    Value        Reference Range Interpretation Comments

 

             TRIGLYCERIDES (BEAKER) (test code = 176 mg/dL                      

        



             540)                                                

 

             CHOLESTEROL (BEAKER) (test code = 186 mg/dL                        

      



             631)                                                

 

             HDL CHOLESTEROL (BEAKER) (test code 36 mg/dL                       

        



             = 976)                                              

 

             LDL CHOLESTEROL CALCULATED (BEAKER) 115 mg/dL                      

        



             (test code = 633)                                        



Triglyceride Reference Range:   Low Risk         &lt;150   Borderline    150-199
   High Risk     200-499   Very High Risk  &gt;=500Cholesterol Reference Range: 
  Low Risk         &lt;200   Borderline 200-239    High Risk        &gt;240HDL 
Cholesterol Reference Range:   Low Risk         &gt;=60   High Risk         
&lt;40LDL Cholesterol Reference Range:   Optimal          &lt;100   Near Optimal
  100-129   Borderline    130-159   High          160-189   Very High       
&gt;=190    ID - DBURINALYSIS W/ REFLEX URINE [id number] 20:26:00





             Test Item    Value        Reference Range Interpretation Comments

 

             COLOR (BEAKER) (test code = 470) Light Yellow                      

     

 

             CLARITY (BEAKER) (test code = Clear                                

  



             469)                                                

 

             SPECIFIC GRAVITY UA (BEAKER) 1.027        1.001-1.035              

 



             (test code = 468)                                        

 

             PH UA (BEAKER) (test code = 467) 5.0          5.0-8.0              

     

 

             PROTEIN UA (BEAKER) (test code = 20 mg/dL     Negative     A       

     



             464)                                                

 

             GLUCOSE UA (BEAKER) (test code = >1000 mg/dL  Negative     A       

     



             365)                                                

 

             KETONES UA (BEAKER) (test code = Negative     Negative             

     



             371)                                                

 

             BILIRUBIN UA (BEAKER) (test code Negative     Negative             

     



             = 462)                                              

 

             BLOOD UA (BEAKER) (test code = Moderate     Negative     A         

   



             461)                                                

 

             NITRITE UA (BEAKER) (test code = Negative     Negative             

     



             465)                                                

 

             LEUKOCYTE ESTERASE UA (BEAKER) Negative     Negative               

   



             (test code = 466)                                        

 

             UROBILINOGEN UA (BEAKER) (test 0.2 mg/dL    0.2-1.0                

   



             code = 463)                                         

 

             RBC UA (BEAKER) (test code = 1 /HPF                                

 



             519)                                                

 

             WBC UA (BEAKER) (test code = 1 /HPF                                

 



             520)                                                

 

             SQUAMOUS EPITHELIAL (BEAKER) < /HPF                                

 



             (test code = 516)                                        

 

             AMORPHOUS CRYSTALS (BEAKER) Rare                                   



             (test code = 1584)                                        

 

             YEAST (BEAKER) (test code = Rare                                   



             1585)                                               

 

             SOURCE(BEAKER) (test code =                                        



             1363)                                               



 ID - [auto] ID - andreiSARS-COV2/RT-PCR ([facility]; REF [facility])
2020 19:07:00





             Test Item    Value        Reference Range Interpretation Comments

 

             SARS-COV2/RT-PCR (test Not Detected Not Detected, Negative         

     



             code = 1796102)                                        

 

             SARS-COV-2 PERFORMING LAB [facility]                                  



             (test code = 0872939)                                        



Negative results do not preclude SARS-CoV-2 infection and should not be used as 
the sole basis for patient management decisions. Negative results must be 
combined with clinical observations, patient history, and epidemiological 
information. A false negative result may occur if a specimen is improperly
collected, transported or handled.The limit of detection for this assay is 250 
copies/mL.This SARS CoV-2 test is a rapid, real-time RT-PCR test intended for 
the qualitative detection of nucleic acid from SARS-CoV-2 in a nasopharyngeal 
swab specimen collected from individuals suspected of COVID-19 by their 
healthcare provider.This test has not been Food and Drug Administration (FDA) 
cleared or approved and has been authorized by FDA under an Emergency Use 
Authorization (EUA). This EUA will be effective until the declaration that 
circumstances exist justifying the authorization of the emergency use of in 
vitro diagnostic tests for detection and/or diagnosis of COVID-19 is terminated 
under Section 564(b)(2) of the Act or the EUA is revoked under Section 564(g) of
 the Act.Fact Sheet for Healthcare Pro
viders:https://www.[facility]/Documents/Xpert%20Xpress%20SARS%20CoV-2/Fact%20Sh

eets/3023802%81MXSA-YBX-5%20HEALTHCARE%20PROVIDERS%20FACT%20SHEET.pdfFact Sheet
 for Healthcare Patients:https://www.[employer]/Documents/Xpert%20Xpress%20SARS%20CoV-2/Fact%20Sheets/3023801%20SARS-COV

-2%20PATIENT%20FACT%20SHEET.pdfPerforming Laboratory:[facility], [place] [address]HS/[id number] 18:29:00





             Test Item    Value        Reference Range Interpretation Comments

 

             PROTIME (BEAKER) (test code = 14.7 seconds 11.9-14.2    H          

  



             759)                                                

 

             INR (BEAKER) (test code = 370) 1.2          <=5.9                  

   

 

             PARTIAL THROMBOPLASTIN TIME 25.1 seconds 22.5-36.0                 



             (BEAKER) (test code = 760)                                        



Effective 2019: PT Reference Range ChangeNew: 11.9-14.2  Previous: 11.7-
14.7RECOMMENDED COUMADIN/WARFARIN INR THERAPY RANGESSTANDARD DOSE: 2.0-3.0  
Includes: PROPHYLAXIS for venous thrombosis, systemic embolization; TREATMENT 
for venous thrombosis and/or pulmonary embolus.HIGH RISK: Target INR is2.5-3.5 
for patients wiht mechanical heart valves.PROTHROMBIN TIME/[id number] 
18:28:00





             Test Item    Value        Reference Range Interpretation Comments

 

             PROTIME (BEAKER) (test code = 14.7 seconds 11.9-14.2    H          

  



             759)                                                

 

             INR (BEAKER) (test code = 370) 1.2          <=5.9                  

   



Effective 2019: PT Reference Range ChangeNew: 11.9-14.2  Previous: 11.7-
14.7RECOMMENDED COUMADIN/WARFARIN INR THERAPY RANGESSTANDARD DOSE: 2.0-3.0  
Includes: PROPHYLAXIS for venous thrombosis, systemic embolization; TREATMENT 
for venous thrombosis and/or pulmonary embolus.HIGH RISK: Target INR is2.5-3.5 
for patients wiht mechanical heart valves.COMPREHENSIVE METABOLIC PANEL
2020 18:26:00





             Test Item    Value        Reference Range Interpretation Comments

 

             TOTAL PROTEIN 7.4 gm/dL    6.0-8.3                   



             (BEAKER) (test code =                                        



             770)                                                

 

             ALBUMIN (BEAKER) 4.0 g/dL     3.5-5.0                   



             (test code = 1145)                                        

 

             ALKALINE PHOSPHATASE 47 U/L                           



             (BEAKER) (test code =                                        



             346)                                                

 

             BILIRUBIN TOTAL 0.5 mg/dL    0.2-1.2                   



             (BEAKER) (test code =                                        



             377)                                                

 

             SODIUM (BEAKER) (test 139 meq/L    136-145                   



             code = 381)                                         

 

             POTASSIUM (BEAKER) 5.1 meq/L    3.5-5.1                   



             (test code = 379)                                        

 

             CHLORIDE (BEAKER) 108 meq/L           H            



             (test code = 382)                                        

 

             CO2 (BEAKER) (test 23 meq/L     22-29                     



             code = 355)                                         

 

             BLOOD UREA NITROGEN 46 mg/dL     7-21         H            



             (BEAKER) (test code =                                        



             354)                                                

 

             CREATININE (BEAKER) 2.75 mg/dL   0.57-1.25    H            



             (test code = 358)                                        

 

             GLUCOSE RANDOM 170 mg/dL           H            



             (BEAKER) (test code =                                        



             652)                                                

 

             CALCIUM (BEAKER) 8.8 mg/dL    8.4-10.2                  



             (test code = 697)                                        

 

             AST (SGOT) (BEAKER) 35 U/L       5-34         H            



             (test code = 353)                                        

 

             ALT (SGPT) (BEAKER) 42 U/L       6-55                      



             (test code = 347)                                        

 

             EGFR (BEAKER) (test 24 mL/min/1.73                           ESTIMA

MORENA GFR IS



             code = 1092) sq m                                   NOT AS ACCURATE

 AS



                                                                 CREATININE



                                                                 CLEARANCE IN



                                                                 PREDICTING



                                                                 GLOMERULAR



                                                                 FILTRATION RATE

.



                                                                 ESTIMATED GFR I

S



                                                                 NOT APPLICABLE 

FOR



                                                                 DIALYSIS PATIEN

TS.



 ID - DBLACTIC ACID, [id number] 18:20:00





             Test Item    Value        Reference Range Interpretation Comments

 

             LACTATE BLOOD VENOUS 1.53 mmol/L  0.50-2.20                 Specime

n slightly



             (2) (BEAKER) (test                                        hemolyzed



             code = 4457)                                        



 ID - DBCBC W/PLT COUNT &amp; AUTO [id number] 18:05:00





             Test Item    Value        Reference Range Interpretation Comments

 

             WHITE BLOOD CELL COUNT (BEAKER) 7.9 K/ L     3.5-10.5              

    



             (test code = 775)                                        

 

             RED BLOOD CELL COUNT (BEAKER) 4.36 M/ L    4.63-6.08    L          

  



             (test code = 761)                                        

 

             HEMOGLOBIN (BEAKER) (test code = 14.4 GM/DL   13.7-17.5            

     



             410)                                                

 

             HEMATOCRIT (BEAKER) (test code = 45.2 %       40.1-51.0            

     



             411)                                                

 

             MEAN CORPUSCULAR VOLUME (BEAKER) 103.7 fL     79.0-92.2    H       

     



             (test code = 753)                                        

 

             MEAN CORPUSCULAR HEMOGLOBIN 33.0 pg      25.7-32.2    H            



             (BEAKER) (test code = 751)                                        

 

             MEAN CORPUSCULAR HEMOGLOBIN CONC 31.9 GM/DL   32.3-36.5    L       

     



             (BEAKER) (test code = 752)                                        

 

             RED CELL DISTRIBUTION WIDTH 14.6 %       11.6-14.4    H            



             (BEAKER) (test code = 412)                                        

 

             PLATELET COUNT (BEAKER) (test 233 K/CU MM  150-450                 

  



             code = 756)                                         

 

             MEAN PLATELET VOLUME (BEAKER) 10.6 fL      9.4-12.4                

  



             (test code = 754)                                        

 

             NUCLEATED RED BLOOD CELLS 0 /100 WBC   0-0                       



             (BEAKER) (test code = 413)                                        

 

             NEUTROPHILS RELATIVE PERCENT 79 %                                  

 



             (BEAKER) (test code = 429)                                        

 

             LYMPHOCYTES RELATIVE PERCENT 12 %                                  

 



             (BEAKER) (test code = 430)                                        

 

             MONOCYTES RELATIVE PERCENT 8 %                                    



             (BEAKER) (test code = 431)                                        

 

             EOSINOPHILS RELATIVE PERCENT 1 %                                   

 



             (BEAKER) (test code = 432)                                        

 

             BASOPHILS RELATIVE PERCENT 0 %                                    



             (BEAKER) (test code = 437)                                        

 

             NEUTROPHILS ABSOLUTE COUNT 6.22 K/ L    1.78-5.38    H            



             (BEAKER) (test code = 670)                                        

 

             LYMPHOCYTES ABSOLUTE COUNT 0.98 K/ L    1.32-3.57    L            



             (BEAKER) (test code = 414)                                        

 

             MONOCYTES ABSOLUTE COUNT (BEAKER) 0.59 K/ L    0.30-0.82           

      



             (test code = 415)                                        

 

             EOSINOPHILS ABSOLUTE COUNT 0.05 K/ L    0.04-0.54                 



             (BEAKER) (test code = 416)                                        

 

             BASOPHILS ABSOLUTE COUNT (BEAKER) 0.03 K/ L    0.01-0.08           

      



             (test code = 417)                                        

 

             IMMATURE GRANULOCYTES-RELATIVE 0 %          0-1                    

   



             PERCENT (BEAKER) (test code =                                      

  



             2801)                                               



POCT-GLUCOSE [id number] 13:22:00





             Test Item    Value        Reference Range Interpretation Comments

 

             POC-GLUCOSE METER 142 mg/dL           H            : TESTED A

T [facility] [address]



             (BEAKER) (test code =                                        [facility]

[facility],



             153)                                               [id number]:



                                                                 /Techni

joe ID



                                                                 = [id number] for HOWIE ULLOA



HEMOGLOBIN [id number] 08:55:00





             Test Item    Value        Reference Range Interpretation Comments

 

             HEMOGLOBIN A1C (BEAKER) (test code = 7.3 %        4.3-6.1      H   

         



             368)                                                



POCT-GLUCOSE [id number] 07:51:00





             Test Item    Value        Reference Range Interpretation Comments

 

             POC-GLUCOSE METER 135 mg/dL           H            : TESTED A

T [facility] [address]



             (BEAKER) (test code =                                        [facility]

[facility] [facility],



             [address])                                               [id number]:



                                                                 /Techni

joe ID



                                                                 = [id number] for COLLIN DUMAS



POCT-GLUCOSE [id number] 06:49:00





             Test Item    Value        Reference Range Interpretation Comments

 

             POC-GLUCOSE METER 110 mg/dL                        : TESTED A

T [facility] [address]



             (BEAKER) (test code =                                        [facility]

[place] [facility],



             [address])                                               [id number]:



                                                                 /Techni

joe ID



                                                                 = [id number] for TIFFANIE TIDWELL



LIPID [id number] 05:31:00





             Test Item    Value        Reference Range Interpretation Comments

 

             TRIGLYCERIDES (BEAKER) (test code = 203 mg/dL                      

        



             540)                                                

 

             CHOLESTEROL (BEAKER) (test code = 156 mg/dL                        

      



             631)                                                

 

             HDL CHOLESTEROL (BEAKER) (test code 33 mg/dL                       

        



             = 976)                                              

 

             LDL CHOLESTEROL CALCULATED (BEAKER) 82 mg/dL                       

        



             (test code = 633)                                        



Triglyceride Reference Range:   Low Risk         &lt;150   Borderline    150-199
   High Risk     200-499   Very High Risk  &gt;=500Cholesterol Reference Range: 
  Low Risk         &lt;200   Borderline 200-239    High Risk        &gt;240HDL 
Cholesterol Reference Range:   Low Risk         &gt;=60   High Risk         
&lt;40LDL Cholesterol Reference Range:   Optimal          &lt;100   Near Optimal
  100-129   Borderline    130-159   High          160-189   Very High       
&gt;=190    ID - EMILIANA MBASIC METABOLIC [id number] 05:31:00





             Test Item    Value        Reference Range Interpretation Comments

 

             SODIUM (BEAKER) 138 meq/L    136-145                   



             (test code = 381)                                        

 

             POTASSIUM (BEAKER) 5.1 meq/L    3.5-5.1                   



             (test code = 379)                                        

 

             CHLORIDE (BEAKER) 107 meq/L                        



             (test code = 382)                                        

 

             CO2 (BEAKER) (test 25 meq/L     22-29                     



             code = 355)                                         

 

             BLOOD UREA NITROGEN 30 mg/dL     7-21         H            



             (BEAKER) (test code                                        



             = 354)                                              

 

             CREATININE (BEAKER) 1.57 mg/dL   0.57-1.25    H            



             (test code = 358)                                        

 

             GLUCOSE RANDOM 113 mg/dL           H            



             (BEAKER) (test code                                        



             = 652)                                              

 

             CALCIUM (BEAKER) 8.9 mg/dL    8.4-10.2                  



             (test code = 697)                                        

 

             EGFR (BEAKER) (test 45 mL/min/1.73                           ESTIMA

MORENA GFR IS



             code = 1092) sq m                                   NOT AS ACCURATE

 AS



                                                                 CREATININE



                                                                 CLEARANCE IN



                                                                 PREDICTING



                                                                 GLOMERULAR



                                                                 FILTRATION RATE

.



                                                                 ESTIMATED GFR I

S



                                                                 NOT APPLICABLE 

FOR



                                                                 DIALYSIS PATIEN

TS.



 ID - EMILIANA MCBC (HEMOGRAM ONLY)2020 05:06:00





             Test Item    Value        Reference Range Interpretation Comments

 

             WHITE BLOOD CELL COUNT (BEAKER) 5.9 K/ L     3.5-10.5              

    



             (test code = 775)                                        

 

             RED BLOOD CELL COUNT (BEAKER) 4.40 M/ L    4.63-6.08    L          

  



             (test code = 761)                                        

 

             HEMOGLOBIN (BEAKER) (test code = 15.2 GM/DL   13.7-17.5            

     



             410)                                                

 

             HEMATOCRIT (BEAKER) (test code = 46.4 %       40.1-51.0            

     



             411)                                                

 

             MEAN CORPUSCULAR VOLUME (BEAKER) 105.5 fL     79.0-92.2    H       

     



             (test code = 753)                                        

 

             MEAN CORPUSCULAR HEMOGLOBIN 34.5 pg      25.7-32.2    H            



             (BEAKER) (test code = 751)                                        

 

             MEAN CORPUSCULAR HEMOGLOBIN CONC 32.8 GM/DL   32.3-36.5            

     



             (BEAKER) (test code = 752)                                        

 

             RED CELL DISTRIBUTION WIDTH 14.6 %       11.6-14.4    H            



             (BEAKER) (test code = 412)                                        

 

             PLATELET COUNT (BEAKER) (test 143 K/CU MM  150-450      L          

  



             code = 756)                                         

 

             MEAN PLATELET VOLUME (BEAKER) 11.0 fL      9.4-12.4                

  



             (test code = 754)                                        

 

             NUCLEATED RED BLOOD CELLS 0 /100 WBC   0-0                       



             (BEAKER) (test code = 413)                                        



POCT-GLUCOSE [id number] 22:18:00





             Test Item    Value        Reference Range Interpretation Comments

 

             POC-GLUCOSE METER 165 mg/dL           H            : TESTED A

T [facility] [address]



             (BEAKER) (test code =                                        [facility]

[facility] [facility],



             [address])                                               [id number]:



                                                                 /Techni

joe ID



                                                                 = [id number] for DEANGELO CUELLO



[id number] 20:28:00





             Test Item    Value        Reference Range Interpretation Comments

 

             ACTIVATED CLOTTING TIME 131 sec                                Refe

rence Range:



             (BEAKER) (test code =                                        

 seconds,



             441)                                                Baseline/TESTED

 AT



                                                                 [facility] [address] [facility] [address]

0



[id number] 17:59:00





             Test Item    Value        Reference Range Interpretation Comments

 

             ACTIVATED CLOTTING TIME 191 sec                                Refe

rence Range:



             (BEAKER) (test code =                                        

 seconds,



             441)                                                Baseline/TESTED

 AT



                                                                 [facility] [address] [facility] [address]

0



[id number] 15:14:00





             Test Item    Value        Reference Range Interpretation Comments

 

             ACTIVATED CLOTTING TIME 246 sec                                Refe

rence Range:



             (BEAKER) (test code =                                        

 seconds,



             441)                                                Baseline/TESTED

 AT



                                                                 [facility] [address] [place]

[facility] [place] [address]

0



HEMOGLOBIN [id number] 09:56:00





             Test Item    Value        Reference Range Interpretation Comments

 

             HEMOGLOBIN A1C (SHARON) (test code = 6.7 %        4.3-6.1      H   

         



             368)                                                



U/S, RENAL WITH [id number] 08:50:00Reason for exam:-&gt;renal 
insufficencyFINAL REPORT PATIENT ID:   [id number] History: Renal insufficiency 
Comparison: None Discussion :  Transverse and longitudinal images of the kidneys
 were performed bilaterally as well as color Doppler andspectral  interrogation 
of the renal arteries as well as the arcuate arteries with calculation of acc
eleration times, acceleration indices and resistive indices. The right kidney is
 normal in size measuring 12.3 x 3.9 x 5.1 cm with cortical thickness of 1.4 cm.
 Cortical echogenicity is within normal limits. There is no evidence for solid 
renal mass, hydronephrosis, or shadowing calculi. The main renal artery and vein
 are patent. The resistive index of the right upper, middle and lower poles of 
the kidney are 0.78, 0.67 and 0.75.  The left kidney is normal in size measuring
 12.4 x 5.2 x 5.0 cm with cortical thickness of 1.5 cm. Cortical echogenicity is
 within normal limits. There is no evidence forsolid renal mass, hydronephrosis,
 or shadowing calculi. The main renal artery and vein are patent. The resistive 
index of the left upper, middle and lower poles of the kidney are 0.70, 0.79 and
 0.76.  The urinary bladder is only partially distended.  IMPRESSION: Mildly 
elevated intrarenal resistive indices which is nonspecific but can be seen in 
the setting of medical renal disease. Otherwise, unremarkable renal ultrasound 
examination and Doppler evaluation. Signed: Benja Corderoort Verified Da
te/Time:  2018 08:50:07 Reading Location: [facility] [id number] Ultrasound Reading 
Room      Electronically signed by: BENJA CORDERO MD on 2018 08:50 AM
BASIC METABOLIC [id number] 07:06:00





             Test Item    Value        Reference Range Interpretation Comments

 

             SODIUM (BEAKER) 138 meq/L    136-145                   



             (test code = 381)                                        

 

             POTASSIUM (BEAKER) 4.8 meq/L    3.5-5.1                   



             (test code = 379)                                        

 

             CHLORIDE (BEAKER) 108 meq/L           H            



             (test code = 382)                                        

 

             CO2 (BEAKER) (test 23 meq/L     22-29                     



             code = 355)                                         

 

             BLOOD UREA NITROGEN 29 mg/dL     7-21         H            



             (BEAKER) (test code                                        



             = 354)                                              

 

             CREATININE (BEAKER) 1.75 mg/dL   0.57-1.25    H            



             (test code = 358)                                        

 

             GLUCOSE RANDOM 107 mg/dL           H            



             (BEAKER) (test code                                        



             = 652)                                              

 

             CALCIUM (BEAKER) 8.5 mg/dL    8.4-10.2                  



             (test code = 697)                                        

 

             EGFR (BEAKER) (test 40 mL/min/1.73                           ESTIMA

MORENA GFR IS



             code = 1092) sq m                                   NOT AS ACCURATE

 AS



                                                                 CREATININE



                                                                 CLEARANCE IN



                                                                 PREDICTING



                                                                 GLOMERULAR



                                                                 FILTRATION RATE

.



                                                                 ESTIMATED GFR I

S



                                                                 NOT APPLICABLE 

FOR



                                                                 DIALYSIS PATIEN

TS.



CBC (HEMOGRAM ONLY)2018 06:41:00





             Test Item    Value        Reference Range Interpretation Comments

 

             WHITE BLOOD CELL COUNT (BEAKER) 6.2 K/ L     3.5-10.5              

    



             (test code = 775)                                        

 

             RED BLOOD CELL COUNT (BEAKER) 3.70 M/ L    4.63-6.08    L          

  



             (test code = 761)                                        

 

             HEMOGLOBIN (BEAKER) (test code = 12.9 GM/DL   13.7-17.5    L       

     



             410)                                                

 

             HEMATOCRIT (BEAKER) (test code = 40.0 %       40.1-51.0    L       

     



             411)                                                

 

             MEAN CORPUSCULAR VOLUME (BEAKER) 108.1 fL     79.0-92.2    H       

     



             (test code = 753)                                        

 

             MEAN CORPUSCULAR HEMOGLOBIN 34.9 pg      25.7-32.2    H            



             (BEAKER) (test code = 751)                                        

 

             MEAN CORPUSCULAR HEMOGLOBIN CONC 32.3 GM/DL   32.3-36.5            

     



             (BEAKER) (test code = 752)                                        

 

             RED CELL DISTRIBUTION WIDTH 14.6 %       11.6-14.4    H            



             (BEAKER) (test code = 412)                                        

 

             PLATELET COUNT (BEAKER) (test 180 K/CU MM  150-450                 

  



             code = 756)                                         

 

             MEAN PLATELET VOLUME (BEAKER) 10.6 fL      9.4-12.4                

  



             (test code = 754)                                        

 

             NUCLEATED RED BLOOD CELLS 0 /100 WBC   0-0                       



             ([facility]) (test code = 413)                                        



[id number] 17:17:00





             Test Item    Value        Reference Range Interpretation Comments

 

             ACTIVATED CLOTTING TIME 125 sec                                TEST

ED AT [place] [address]



             ([facility]) (test code =                                        RITIKA MERCADO [facility]



             441)                                                [id number]



[id number] 15:30:00





             Test Item    Value        Reference Range Interpretation Comments

 

             ACTIVATED CLOTTING TIME 142 sec                                TEST

ED AT [place] [address]



             ([facility]) (test code =                                        RITIKA MERCADO [place] [address])                                                [address]



[id number] 11:38:00





             Test Item    Value        Reference Range Interpretation Comments

 

             ACTIVATED CLOTTING TIME 340 sec                                TEST

ED AT [place] [address]



             ([facility]) (test code =                                        ANTONIONE

ROGELIO [place] [address])                                                [id number]



PLATELET AGGREGATION: FUNCTION [id number] 11:38:00





             Test Item    Value        Reference Range Interpretation Comments

 

             WEAK ADP     60 %         60-91                     



             RESULT([facility]) (test                                        



             code = 2135)                                        

 

             PLATELET FUNCTION % indicates                           



             SCREEN INTERP ([facility]) normal platelet                           



             (test code = 2173) function                               

 

             [id number] Meredith Reyes, MD                           



             ([facility]) (test code = (electronic signature)                       

    



             2508)                                               

 

             PLATELET COUNT  K/CU MM  150-450                   



             ([facility]) (test code =                                        



             2656)                                               



LIPID [id number] 08:43:00





             Test Item    Value        Reference Range Interpretation Comments

 

             TRIGLYCERIDES (AKER) (test code = 150 mg/dL                      

        



             540)                                                

 

             CHOLESTEROL (BEAKER) (test code = 187 mg/dL                        

      



             631)                                                

 

             HDL CHOLESTEROL (AKER) (test code 41 mg/dL                       

        



             = 976)                                              

 

             LDL CHOLESTEROL CALCULATED ([facility]) 116 mg/dL                      

        



             (test code = 633)                                        



Triglyceride Reference Range:   Low Risk         &lt;150   Borderline    150-199
   High Risk     200-499   Very High Risk  &gt;=500Cholesterol Reference Range: 
  Low Risk         &lt;200   Borderline 200-239    High Risk        &gt;240HDL 
Cholesterol Reference Range:   Low Risk         &gt;=60   High Risk         
&lt;40LDL Cholesterol Reference Range:   Optimal          &lt;100   Near Optimal
  100-129   Borderline    130-159   High          160-189   Very High       
&gt;=190BASIC METABOLIC [id number] 07:36:00





             Test Item    Value        Reference Range Interpretation Comments

 

             SODIUM (BEAKER) 140 meq/L    136-145                   



             (test code = 381)                                        

 

             POTASSIUM (BEAKER) 4.6 meq/L    3.5-5.1                   



             (test code = 379)                                        

 

             CHLORIDE (BEAKER) 106 meq/L                        



             (test code = 382)                                        

 

             CO2 (BEAKER) (test 25 meq/L     22-29                     



             code = 355)                                         

 

             BLOOD UREA NITROGEN 27 mg/dL     7-21         H            



             (BEAKER) (test code                                        



             = 354)                                              

 

             CREATININE (BEAKER) 1.70 mg/dL   0.57-1.25    H            



             (test code = 358)                                        

 

             GLUCOSE RANDOM 109 mg/dL           H            



             (BEAKER) (test code                                        



             = 652)                                              

 

             CALCIUM (BEAKER) 9.6 mg/dL    8.4-10.2                  



             (test code = 697)                                        

 

             EGFR (BEAKER) (test 41 mL/min/1.73                           ESTIMA

MORENA GFR IS



             code = 1092) sq m                                   NOT AS ACCURATE

 AS



                                                                 CREATININE



                                                                 CLEARANCE IN



                                                                 PREDICTING



                                                                 GLOMERULAR



                                                                 FILTRATION RATE

.



                                                                 ESTIMATED GFR I

S



                                                                 NOT APPLICABLE 

FOR



                                                                 DIALYSIS PATIEN

TS.



BASIC METABOLIC [id number] 10:35:00





             Test Item    Value        Reference Range Interpretation Comments

 

             SODIUM (BEAKER) 146 meq/L    136-145      H            



             (test code = 381)                                        

 

             POTASSIUM (BEAKER) 5.9 meq/L    3.5-5.1      H            



             (test code = 379)                                        

 

             CHLORIDE (BEAKER) 105 meq/L                        



             (test code = 382)                                        

 

             CO2 (BEAKER) (test 26 meq/L     22-29                     



             code = 355)                                         

 

             BLOOD UREA NITROGEN 26 mg/dL     7-21         H            



             (BEAKER) (test code                                        



             = 354)                                              

 

             CREATININE (BEAKER) 1.87 mg/dL   0.57-1.25    H            



             (test code = 358)                                        

 

             GLUCOSE RANDOM 114 mg/dL           H            



             (BEAKER) (test code                                        



             = 652)                                              

 

             CALCIUM (BEAKER) 10.2 mg/dL   8.4-10.2                  



             (test code = 697)                                        

 

             EGFR (BEAKER) (test 37 mL/min/1.73                           ESTIMA

MORENA GFR IS



             code = 1092) sq m                                   NOT AS ACCURATE

 AS



                                                                 CREATININE



                                                                 CLEARANCE IN



                                                                 PREDICTING



                                                                 GLOMERULAR



                                                                 FILTRATION RATE

.



                                                                 ESTIMATED GFR I

S



                                                                 NOT APPLICABLE 

FOR



                                                                 DIALYSIS PATIEN

TS.



CBC W/PLT COUNT &amp; AUTO [id number] 10:22:00





             Test Item    Value        Reference Range Interpretation Comments

 

             WHITE BLOOD CELL COUNT (BEAKER) 6.5 K/ L     3.5-10.5              

    



             (test code = 775)                                        

 

             RED BLOOD CELL COUNT (BEAKER) 4.50 M/ L    4.63-6.08    L          

  



             (test code = 761)                                        

 

             HEMOGLOBIN (BEAKER) (test code = 15.5 GM/DL   13.7-17.5            

     



             410)                                                

 

             HEMATOCRIT (BEAKER) (test code = 48.6 %       40.1-51.0            

     



             411)                                                

 

             MEAN CORPUSCULAR VOLUME (BEAKER) 108.0 fL     79.0-92.2    H       

     



             (test code = 753)                                        

 

             MEAN CORPUSCULAR HEMOGLOBIN 34.4 pg      25.7-32.2    H            



             (BEAKER) (test code = 751)                                        

 

             MEAN CORPUSCULAR HEMOGLOBIN CONC 31.9 GM/DL   32.3-36.5    L       

     



             (BEAKER) (test code = 752)                                        

 

             RED CELL DISTRIBUTION WIDTH 14.5 %       11.6-14.4    H            



             (BEAKER) (test code = 412)                                        

 

             PLATELET COUNT (BEAKER) (test 184 K/CU MM  150-450                 

  



             code = 756)                                         

 

             MEAN PLATELET VOLUME (BEAKER) 10.2 fL      9.4-12.4                

  



             (test code = 754)                                        

 

             NUCLEATED RED BLOOD CELLS 0 /100 WBC   0-0                       



             (BEAKER) (test code = 413)                                        

 

             NEUTROPHILS RELATIVE PERCENT 67 %                                  

 



             (BEAKER) (test code = 429)                                        

 

             LYMPHOCYTES RELATIVE PERCENT 22 %                                  

 



             (BEAKER) (test code = 430)                                        

 

             MONOCYTES RELATIVE PERCENT 7 %                                    



             (BEAKER) (test code = 431)                                        

 

             EOSINOPHILS RELATIVE PERCENT 3 %                                   

 



             (BEAKER) (test code = 432)                                        

 

             BASOPHILS RELATIVE PERCENT 1 %                                    



             (BEAKER) (test code = 437)                                        

 

             NEUTROPHILS ABSOLUTE COUNT 4.34 K/ L    1.78-5.38                 



             (BEAKER) (test code = 670)                                        

 

             LYMPHOCYTES ABSOLUTE COUNT 1.39 K/ L    1.32-3.57                 



             (BEAKER) (test code = 414)                                        

 

             MONOCYTES ABSOLUTE COUNT (BEAKER) 0.48 K/ L    0.30-0.82           

      



             (test code = 415)                                        

 

             EOSINOPHILS ABSOLUTE COUNT 0.18 K/ L    0.04-0.54                 



             (BEAKER) (test code = 416)                                        

 

             BASOPHILS ABSOLUTE COUNT (BEAKER) 0.05 K/ L    0.01-0.08           

      



             (test code = 417)                                        

 

             IMMATURE GRANULOCYTES-RELATIVE 1 %          0-1                    

   



             PERCENT (BEAKER) (test code =                                      

  



             2801)

## 2022-01-22 LAB
COHGB MFR BLDA: 0.9 % (ref 0–1.5)
OXYHGB MFR BLDA: 96.6 % (ref 94–97)
SAO2 % BLDA: 99 % (ref 92–98.5)